# Patient Record
Sex: MALE | Race: WHITE | Employment: UNEMPLOYED | URBAN - NONMETROPOLITAN AREA
[De-identification: names, ages, dates, MRNs, and addresses within clinical notes are randomized per-mention and may not be internally consistent; named-entity substitution may affect disease eponyms.]

---

## 2017-11-26 ENCOUNTER — HOSPITAL ENCOUNTER (INPATIENT)
Age: 32
LOS: 12 days | Discharge: NURSING, REHAB OR LONG-TERM CARE | DRG: 082 | End: 2017-12-08
Attending: FAMILY MEDICINE | Admitting: SURGERY
Payer: COMMERCIAL

## 2017-11-26 ENCOUNTER — APPOINTMENT (OUTPATIENT)
Dept: GENERAL RADIOLOGY | Age: 32
DRG: 082 | End: 2017-11-26
Payer: COMMERCIAL

## 2017-11-26 ENCOUNTER — APPOINTMENT (OUTPATIENT)
Dept: CT IMAGING | Age: 32
DRG: 082 | End: 2017-11-26
Payer: COMMERCIAL

## 2017-11-26 DIAGNOSIS — S01.311A EAR LOBE LACERATION, RIGHT, INITIAL ENCOUNTER: ICD-10-CM
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
## 2017-11-26 LAB
ANION GAP SERPL CALCULATED.3IONS-SCNC: 16 MEQ/L (ref 8–16)
BUN BLDV-MCNC: 18 MG/DL (ref 7–22)
CALCIUM SERPL-MCNC: 8.9 MG/DL (ref 8.5–10.5)
CHLORIDE BLD-SCNC: 99 MEQ/L (ref 98–111)
CO2: 25 MEQ/L (ref 23–33)
CREAT SERPL-MCNC: 0.7 MG/DL (ref 0.4–1.2)
ETHYL ALCOHOL, SERUM: < 0.01 %
GFR SERPL CREATININE-BSD FRML MDRD: > 90 ML/MIN/1.73M2
GLUCOSE BLD-MCNC: 156 MG/DL (ref 70–108)
OSMOLALITY CALCULATION: 284.5 MOSMOL/KG (ref 275–300)
POTASSIUM SERPL-SCNC: 3.3 MEQ/L (ref 3.5–5.2)
SODIUM BLD-SCNC: 140 MEQ/L (ref 135–145)
TROPONIN T: < 0.01 NG/ML

## 2017-11-26 PROCEDURE — 70450 CT HEAD/BRAIN W/O DYE: CPT

## 2017-11-26 PROCEDURE — 80048 BASIC METABOLIC PNL TOTAL CA: CPT

## 2017-11-26 PROCEDURE — 6360000002 HC RX W HCPCS: Performed by: FAMILY MEDICINE

## 2017-11-26 PROCEDURE — 99285 EMERGENCY DEPT VISIT HI MDM: CPT

## 2017-11-26 PROCEDURE — 84484 ASSAY OF TROPONIN QUANT: CPT

## 2017-11-26 PROCEDURE — 96365 THER/PROPH/DIAG IV INF INIT: CPT

## 2017-11-26 PROCEDURE — G0480 DRUG TEST DEF 1-7 CLASSES: HCPCS

## 2017-11-26 PROCEDURE — 2700000000 HC OXYGEN THERAPY PER DAY

## 2017-11-26 PROCEDURE — A6212 FOAM DRG <=16 SQ IN W/BORDER: HCPCS

## 2017-11-26 PROCEDURE — 3209999900 XR COMPARISON OF OUTSIDE FILMS

## 2017-11-26 PROCEDURE — 6360000004 HC RX CONTRAST MEDICATION: Performed by: FAMILY MEDICINE

## 2017-11-26 PROCEDURE — 96368 THER/DIAG CONCURRENT INF: CPT

## 2017-11-26 PROCEDURE — 90715 TDAP VACCINE 7 YRS/> IM: CPT | Performed by: FAMILY MEDICINE

## 2017-11-26 PROCEDURE — 2000000000 HC ICU R&B

## 2017-11-26 PROCEDURE — 71260 CT THORAX DX C+: CPT

## 2017-11-26 PROCEDURE — 96366 THER/PROPH/DIAG IV INF ADDON: CPT

## 2017-11-26 PROCEDURE — 90471 IMMUNIZATION ADMIN: CPT | Performed by: FAMILY MEDICINE

## 2017-11-26 PROCEDURE — 2580000003 HC RX 258: Performed by: FAMILY MEDICINE

## 2017-11-26 PROCEDURE — 6360000002 HC RX W HCPCS

## 2017-11-26 PROCEDURE — 76376 3D RENDER W/INTRP POSTPROCES: CPT

## 2017-11-26 PROCEDURE — 5A1955Z RESPIRATORY VENTILATION, GREATER THAN 96 CONSECUTIVE HOURS: ICD-10-PCS | Performed by: SURGERY

## 2017-11-26 PROCEDURE — 6820000003 HC L2 TRAUMA ALERT ACTIVATION

## 2017-11-26 PROCEDURE — 76705 ECHO EXAM OF ABDOMEN: CPT

## 2017-11-26 PROCEDURE — 70486 CT MAXILLOFACIAL W/O DYE: CPT

## 2017-11-26 PROCEDURE — 74177 CT ABD & PELVIS W/CONTRAST: CPT

## 2017-11-26 PROCEDURE — 36415 COLL VENOUS BLD VENIPUNCTURE: CPT

## 2017-11-26 PROCEDURE — 94002 VENT MGMT INPAT INIT DAY: CPT

## 2017-11-26 PROCEDURE — 85025 COMPLETE CBC W/AUTO DIFF WBC: CPT

## 2017-11-26 PROCEDURE — 85576 BLOOD PLATELET AGGREGATION: CPT

## 2017-11-26 RX ORDER — 0.9 % SODIUM CHLORIDE 0.9 %
1000 INTRAVENOUS SOLUTION INTRAVENOUS ONCE
Status: COMPLETED | OUTPATIENT
Start: 2017-11-26 | End: 2017-11-27

## 2017-11-26 RX ORDER — PROPOFOL 10 MG/ML
INJECTION, EMULSION INTRAVENOUS
Status: COMPLETED
Start: 2017-11-26 | End: 2017-11-26

## 2017-11-26 RX ORDER — PROPOFOL 10 MG/ML
10 INJECTION, EMULSION INTRAVENOUS
Status: DISCONTINUED | OUTPATIENT
Start: 2017-11-26 | End: 2017-12-01

## 2017-11-26 RX ADMIN — TETANUS TOXOID, REDUCED DIPHTHERIA TOXOID AND ACELLULAR PERTUSSIS VACCINE, ADSORBED 0.5 ML: 5; 2.5; 8; 8; 2.5 SUSPENSION INTRAMUSCULAR at 23:56

## 2017-11-26 RX ADMIN — FENTANYL CITRATE 12.5 MCG/HR: 50 INJECTION, SOLUTION INTRAMUSCULAR; INTRAVENOUS at 23:56

## 2017-11-26 RX ADMIN — SODIUM CHLORIDE 1000 ML: 9 INJECTION, SOLUTION INTRAVENOUS at 23:30

## 2017-11-26 RX ADMIN — PROPOFOL 10 MCG/KG/MIN: 10 INJECTION, EMULSION INTRAVENOUS at 22:45

## 2017-11-26 RX ADMIN — IOPAMIDOL 80 ML: 755 INJECTION, SOLUTION INTRAVENOUS at 23:27

## 2017-11-26 ASSESSMENT — PULMONARY FUNCTION TESTS: PIF_VALUE: 24

## 2017-11-27 ENCOUNTER — APPOINTMENT (OUTPATIENT)
Dept: CT IMAGING | Age: 32
DRG: 082 | End: 2017-11-27
Payer: COMMERCIAL

## 2017-11-27 ENCOUNTER — APPOINTMENT (OUTPATIENT)
Dept: GENERAL RADIOLOGY | Age: 32
DRG: 082 | End: 2017-11-27
Payer: COMMERCIAL

## 2017-11-27 PROBLEM — S00.03XA CONTUSION OF PARIETAL REGION OF SCALP: Status: ACTIVE | Noted: 2017-11-27

## 2017-11-27 PROBLEM — S60.519A HAND ABRASION: Status: ACTIVE | Noted: 2017-11-27

## 2017-11-27 PROBLEM — S06.9XAA TBI (TRAUMATIC BRAIN INJURY): Status: ACTIVE | Noted: 2017-11-27

## 2017-11-27 LAB
ALLEN TEST: POSITIVE
ALLEN TEST: POSITIVE
ANGLE TEG: 67 DEG (ref 53–72)
ANION GAP SERPL CALCULATED.3IONS-SCNC: 12 MEQ/L (ref 8–16)
BANDED NEUTROPHILS ABSOLUTE COUNT: 0.4 THOU/MM3
BANDS PRESENT: 2 %
BASE EXCESS (CALCULATED): 0.4 MMOL/L (ref -2.5–2.5)
BASE EXCESS (CALCULATED): 1.2 MMOL/L (ref -2.5–2.5)
BASOPHILS # BLD: 0 %
BASOPHILS # BLD: 0 %
BASOPHILS ABSOLUTE: 0 THOU/MM3 (ref 0–0.1)
BASOPHILS ABSOLUTE: 0 THOU/MM3 (ref 0–0.1)
BUN BLDV-MCNC: 15 MG/DL (ref 7–22)
CALCIUM SERPL-MCNC: 8.4 MG/DL (ref 8.5–10.5)
CHLORIDE BLD-SCNC: 101 MEQ/L (ref 98–111)
CO2: 26 MEQ/L (ref 23–33)
COLLECTED BY:: ABNORMAL
COLLECTED BY:: NORMAL
CREAT SERPL-MCNC: 0.6 MG/DL (ref 0.4–1.2)
DEVICE: ABNORMAL
DEVICE: NORMAL
DIFFERENTIAL, MANUAL: NORMAL
EOSINOPHIL # BLD: 0 %
EOSINOPHIL # BLD: 0.2 %
EOSINOPHILS ABSOLUTE: 0 THOU/MM3 (ref 0–0.4)
EOSINOPHILS ABSOLUTE: 0 THOU/MM3 (ref 0–0.4)
EPL-TEG: 0 %
GFR SERPL CREATININE-BSD FRML MDRD: > 90 ML/MIN/1.73M2
GLUCOSE BLD-MCNC: 130 MG/DL (ref 70–108)
HCO3: 25 MMOL/L (ref 23–28)
HCO3: 28 MMOL/L (ref 23–28)
HCT VFR BLD CALC: 45 % (ref 42–52)
HCT VFR BLD CALC: 51 % (ref 42–52)
HEMOGLOBIN: 15.3 GM/DL (ref 14–18)
HEMOGLOBIN: 17.1 GM/DL (ref 14–18)
HEPARIN THERAPY: NO
IFIO2: 25
IFIO2: 80
INHIBITION AA TEG: 11.4 %
INHIBITION ADP TEG: 28.8 %
KINETICS TEG: 2.1 MINUTES (ref 1–3)
LY30 (LYSIS) TEG: 0 % (ref 0–7.5)
LYMPHOCYTES # BLD: 15 %
LYMPHOCYTES # BLD: 8 %
LYMPHOCYTES ABSOLUTE: 1 THOU/MM3 (ref 1–4.8)
LYMPHOCYTES ABSOLUTE: 3.1 THOU/MM3 (ref 1–4.8)
MA (MAX CLOT) TEG: 58.6 MM (ref 50–70)
MA(AA) TEG: 52.6 MM
MA(ACTIVATED) TEG: 5.9 MM
MA(ADP) TEG: 43.4 MM
MCH RBC QN AUTO: 29.1 PG (ref 27–31)
MCH RBC QN AUTO: 29.4 PG (ref 27–31)
MCHC RBC AUTO-ENTMCNC: 33.5 GM/DL (ref 33–37)
MCHC RBC AUTO-ENTMCNC: 34 GM/DL (ref 33–37)
MCV RBC AUTO: 86.6 FL (ref 80–94)
MCV RBC AUTO: 86.8 FL (ref 80–94)
MODE: AC
MODE: NORMAL
MONOCYTES # BLD: 4 %
MONOCYTES # BLD: 6.5 %
MONOCYTES ABSOLUTE: 0.8 THOU/MM3 (ref 0.4–1.3)
MONOCYTES ABSOLUTE: 0.8 THOU/MM3 (ref 0.4–1.3)
MRSA SCREEN RT-PCR: NEGATIVE
NUCLEATED RED BLOOD CELLS: 0 /100 WBC
NUCLEATED RED BLOOD CELLS: 0 /100 WBC
O2 SATURATION: 100 %
O2 SATURATION: 96 %
PCO2: 39 MMHG (ref 35–45)
PCO2: 49 MMHG (ref 35–45)
PDW BLD-RTO: 12.8 % (ref 11.5–14.5)
PDW BLD-RTO: 13.1 % (ref 11.5–14.5)
PH BLOOD GAS: 7.36 (ref 7.35–7.45)
PH BLOOD GAS: 7.42 (ref 7.35–7.45)
PLATELET # BLD: 235 THOU/MM3 (ref 130–400)
PLATELET # BLD: 255 THOU/MM3 (ref 130–400)
PLATELET ESTIMATE: ADEQUATE
PMV BLD AUTO: 8.8 MCM (ref 7.4–10.4)
PMV BLD AUTO: 8.9 MCM (ref 7.4–10.4)
PO2: 201 MMHG (ref 71–104)
PO2: 79 MMHG (ref 71–104)
POTASSIUM SERPL-SCNC: 3.8 MEQ/L (ref 3.5–5.2)
RBC # BLD: 5.19 MILL/MM3 (ref 4.7–6.1)
RBC # BLD: 5.88 MILL/MM3 (ref 4.7–6.1)
REACTION TIME TEG: 4.1 MINUTES (ref 5–10)
SEG NEUTROPHILS: 79 %
SEG NEUTROPHILS: 85.3 %
SEGMENTED NEUTROPHILS ABSOLUTE COUNT: 10.9 THOU/MM3 (ref 1.8–7.7)
SEGMENTED NEUTROPHILS ABSOLUTE COUNT: 16.3 THOU/MM3 (ref 1.8–7.7)
SET PEEP: 5 MMHG
SET RESPIRATORY RATE: 14 BPM
SET TIDAL VOLUME: 700 ML
SODIUM BLD-SCNC: 139 MEQ/L (ref 135–145)
SOURCE, BLOOD GAS: ABNORMAL
SOURCE, BLOOD GAS: NORMAL
WBC # BLD: 12.8 THOU/MM3 (ref 4.8–10.8)
WBC # BLD: 20.6 THOU/MM3 (ref 4.8–10.8)

## 2017-11-27 PROCEDURE — 70450 CT HEAD/BRAIN W/O DYE: CPT

## 2017-11-27 PROCEDURE — 6360000002 HC RX W HCPCS: Performed by: SURGERY

## 2017-11-27 PROCEDURE — 87081 CULTURE SCREEN ONLY: CPT

## 2017-11-27 PROCEDURE — 99233 SBSQ HOSP IP/OBS HIGH 50: CPT | Performed by: SURGERY

## 2017-11-27 PROCEDURE — 2580000003 HC RX 258: Performed by: FAMILY MEDICINE

## 2017-11-27 PROCEDURE — 94003 VENT MGMT INPAT SUBQ DAY: CPT

## 2017-11-27 PROCEDURE — 94640 AIRWAY INHALATION TREATMENT: CPT

## 2017-11-27 PROCEDURE — 99999 PR OFFICE/OUTPT VISIT,PROCEDURE ONLY: CPT | Performed by: NURSE PRACTITIONER

## 2017-11-27 PROCEDURE — 2700000000 HC OXYGEN THERAPY PER DAY

## 2017-11-27 PROCEDURE — 6370000000 HC RX 637 (ALT 250 FOR IP): Performed by: SURGERY

## 2017-11-27 PROCEDURE — 71010 XR CHEST PORTABLE: CPT

## 2017-11-27 PROCEDURE — 3209999900 CT COMPARISON OF OUTSIDE FILMS

## 2017-11-27 PROCEDURE — 87641 MR-STAPH DNA AMP PROBE: CPT

## 2017-11-27 PROCEDURE — C9113 INJ PANTOPRAZOLE SODIUM, VIA: HCPCS | Performed by: SURGERY

## 2017-11-27 PROCEDURE — 36600 WITHDRAWAL OF ARTERIAL BLOOD: CPT

## 2017-11-27 PROCEDURE — 85025 COMPLETE CBC W/AUTO DIFF WBC: CPT

## 2017-11-27 PROCEDURE — 2580000003 HC RX 258: Performed by: SURGERY

## 2017-11-27 PROCEDURE — L0120 CERV FLEX N/ADJ FOAM PRE OTS: HCPCS

## 2017-11-27 PROCEDURE — 6360000002 HC RX W HCPCS: Performed by: FAMILY MEDICINE

## 2017-11-27 PROCEDURE — 99291 CRITICAL CARE FIRST HOUR: CPT | Performed by: INTERNAL MEDICINE

## 2017-11-27 PROCEDURE — 82803 BLOOD GASES ANY COMBINATION: CPT

## 2017-11-27 PROCEDURE — 72125 CT NECK SPINE W/O DYE: CPT

## 2017-11-27 PROCEDURE — 6370000000 HC RX 637 (ALT 250 FOR IP): Performed by: INTERNAL MEDICINE

## 2017-11-27 PROCEDURE — 2580000003 HC RX 258: Performed by: INTERNAL MEDICINE

## 2017-11-27 PROCEDURE — 2000000000 HC ICU R&B

## 2017-11-27 PROCEDURE — 96366 THER/PROPH/DIAG IV INF ADDON: CPT

## 2017-11-27 PROCEDURE — 36415 COLL VENOUS BLD VENIPUNCTURE: CPT

## 2017-11-27 PROCEDURE — 80048 BASIC METABOLIC PNL TOTAL CA: CPT

## 2017-11-27 PROCEDURE — 87205 SMEAR GRAM STAIN: CPT

## 2017-11-27 PROCEDURE — 87070 CULTURE OTHR SPECIMN AEROBIC: CPT

## 2017-11-27 PROCEDURE — 6360000002 HC RX W HCPCS: Performed by: INTERNAL MEDICINE

## 2017-11-27 PROCEDURE — 99253 IP/OBS CNSLTJ NEW/EST LOW 45: CPT | Performed by: PHYSICIAN ASSISTANT

## 2017-11-27 PROCEDURE — 6370000000 HC RX 637 (ALT 250 FOR IP): Performed by: PHYSICIAN ASSISTANT

## 2017-11-27 RX ORDER — PANTOPRAZOLE SODIUM 40 MG/10ML
40 INJECTION, POWDER, LYOPHILIZED, FOR SOLUTION INTRAVENOUS DAILY
Status: DISCONTINUED | OUTPATIENT
Start: 2017-11-27 | End: 2017-11-29

## 2017-11-27 RX ORDER — 0.9 % SODIUM CHLORIDE 0.9 %
10 VIAL (ML) INJECTION DAILY
Status: DISCONTINUED | OUTPATIENT
Start: 2017-11-27 | End: 2017-11-29

## 2017-11-27 RX ORDER — ONDANSETRON 2 MG/ML
4 INJECTION INTRAMUSCULAR; INTRAVENOUS EVERY 6 HOURS PRN
Status: DISCONTINUED | OUTPATIENT
Start: 2017-11-27 | End: 2017-12-08 | Stop reason: HOSPADM

## 2017-11-27 RX ORDER — SODIUM CHLORIDE 0.9 % (FLUSH) 0.9 %
10 SYRINGE (ML) INJECTION EVERY 12 HOURS SCHEDULED
Status: DISCONTINUED | OUTPATIENT
Start: 2017-11-27 | End: 2017-12-08 | Stop reason: HOSPADM

## 2017-11-27 RX ORDER — GINSENG 100 MG
1 CAPSULE ORAL 2 TIMES DAILY
Status: DISCONTINUED | OUTPATIENT
Start: 2017-11-27 | End: 2017-12-08 | Stop reason: HOSPADM

## 2017-11-27 RX ORDER — LEVOFLOXACIN 5 MG/ML
500 INJECTION, SOLUTION INTRAVENOUS ONCE
Status: DISCONTINUED | OUTPATIENT
Start: 2017-11-27 | End: 2017-11-27 | Stop reason: HOSPADM

## 2017-11-27 RX ORDER — POTASSIUM CHLORIDE 20MEQ/15ML
40 LIQUID (ML) ORAL ONCE
Status: COMPLETED | OUTPATIENT
Start: 2017-11-27 | End: 2017-11-27

## 2017-11-27 RX ORDER — SODIUM CHLORIDE 9 MG/ML
INJECTION, SOLUTION INTRAVENOUS CONTINUOUS
Status: DISCONTINUED | OUTPATIENT
Start: 2017-11-27 | End: 2017-12-01

## 2017-11-27 RX ORDER — CHLORHEXIDINE GLUCONATE 0.12 MG/ML
15 RINSE ORAL 2 TIMES DAILY
Status: DISCONTINUED | OUTPATIENT
Start: 2017-11-27 | End: 2017-12-01

## 2017-11-27 RX ORDER — IPRATROPIUM BROMIDE AND ALBUTEROL SULFATE 2.5; .5 MG/3ML; MG/3ML
1 SOLUTION RESPIRATORY (INHALATION) EVERY 4 HOURS
Status: DISCONTINUED | OUTPATIENT
Start: 2017-11-27 | End: 2017-12-03

## 2017-11-27 RX ORDER — ACETAMINOPHEN 325 MG/1
650 TABLET ORAL EVERY 4 HOURS PRN
Status: DISCONTINUED | OUTPATIENT
Start: 2017-11-27 | End: 2017-12-08 | Stop reason: HOSPADM

## 2017-11-27 RX ORDER — SODIUM CHLORIDE 0.9 % (FLUSH) 0.9 %
10 SYRINGE (ML) INJECTION PRN
Status: DISCONTINUED | OUTPATIENT
Start: 2017-11-27 | End: 2017-12-08 | Stop reason: HOSPADM

## 2017-11-27 RX ADMIN — FENTANYL CITRATE 75 MCG/HR: 50 INJECTION, SOLUTION INTRAMUSCULAR; INTRAVENOUS at 15:43

## 2017-11-27 RX ADMIN — CEFAZOLIN SODIUM 1 G: 1 INJECTION, SOLUTION INTRAVENOUS at 10:59

## 2017-11-27 RX ADMIN — IPRATROPIUM BROMIDE AND ALBUTEROL SULFATE 1 AMPULE: .5; 3 SOLUTION RESPIRATORY (INHALATION) at 11:55

## 2017-11-27 RX ADMIN — PROPOFOL 50 MCG/KG/MIN: 10 INJECTION, EMULSION INTRAVENOUS at 09:05

## 2017-11-27 RX ADMIN — SODIUM CHLORIDE: 9 INJECTION, SOLUTION INTRAVENOUS at 00:52

## 2017-11-27 RX ADMIN — BACITRACIN ZINC 1 G: 500 OINTMENT TOPICAL at 20:15

## 2017-11-27 RX ADMIN — PROPOFOL 25 MCG/KG/MIN: 10 INJECTION, EMULSION INTRAVENOUS at 17:39

## 2017-11-27 RX ADMIN — Medication 10 ML: at 09:10

## 2017-11-27 RX ADMIN — CEFAZOLIN SODIUM 1 G: 1 INJECTION, SOLUTION INTRAVENOUS at 03:34

## 2017-11-27 RX ADMIN — IPRATROPIUM BROMIDE AND ALBUTEROL SULFATE 1 AMPULE: .5; 3 SOLUTION RESPIRATORY (INHALATION) at 18:24

## 2017-11-27 RX ADMIN — IPRATROPIUM BROMIDE AND ALBUTEROL SULFATE 1 AMPULE: .5; 3 SOLUTION RESPIRATORY (INHALATION) at 21:03

## 2017-11-27 RX ADMIN — PROPOFOL 50 MCG/KG/MIN: 10 INJECTION, EMULSION INTRAVENOUS at 04:29

## 2017-11-27 RX ADMIN — PROPOFOL 35 MCG/KG/MIN: 10 INJECTION, EMULSION INTRAVENOUS at 20:13

## 2017-11-27 RX ADMIN — PROPOFOL 50 MCG/KG/MIN: 10 INJECTION, EMULSION INTRAVENOUS at 13:25

## 2017-11-27 RX ADMIN — PANTOPRAZOLE SODIUM 40 MG: 40 INJECTION, POWDER, FOR SOLUTION INTRAVENOUS at 09:10

## 2017-11-27 RX ADMIN — POTASSIUM CHLORIDE 40 MEQ: 40 SOLUTION ORAL at 03:20

## 2017-11-27 RX ADMIN — PROPOFOL 50 MCG/KG/MIN: 10 INJECTION, EMULSION INTRAVENOUS at 10:54

## 2017-11-27 RX ADMIN — Medication 10 ML: at 09:11

## 2017-11-27 RX ADMIN — PROPOFOL 50 MCG/KG/MIN: 10 INJECTION, EMULSION INTRAVENOUS at 06:42

## 2017-11-27 RX ADMIN — PROPOFOL 70 MCG/KG/MIN: 10 INJECTION, EMULSION INTRAVENOUS at 00:31

## 2017-11-27 RX ADMIN — SODIUM CHLORIDE: 9 INJECTION, SOLUTION INTRAVENOUS at 09:04

## 2017-11-27 RX ADMIN — ENOXAPARIN SODIUM 40 MG: 40 INJECTION SUBCUTANEOUS at 10:53

## 2017-11-27 RX ADMIN — CEFAZOLIN SODIUM 1 G: 1 INJECTION, SOLUTION INTRAVENOUS at 20:13

## 2017-11-27 RX ADMIN — SODIUM CHLORIDE: 9 INJECTION, SOLUTION INTRAVENOUS at 17:39

## 2017-11-27 RX ADMIN — Medication 15 ML: at 20:13

## 2017-11-27 RX ADMIN — BACITRACIN ZINC 1 G: 500 OINTMENT TOPICAL at 12:43

## 2017-11-27 RX ADMIN — FENTANYL CITRATE 125 MCG/HR: 50 INJECTION, SOLUTION INTRAMUSCULAR; INTRAVENOUS at 08:26

## 2017-11-27 RX ADMIN — Medication 15 ML: at 12:43

## 2017-11-27 RX ADMIN — FENTANYL CITRATE 150 MCG/HR: 50 INJECTION, SOLUTION INTRAMUSCULAR; INTRAVENOUS at 11:08

## 2017-11-27 RX ADMIN — FENTANYL CITRATE 100 MCG/HR: 50 INJECTION, SOLUTION INTRAMUSCULAR; INTRAVENOUS at 20:13

## 2017-11-27 RX ADMIN — PROPOFOL 50 MCG/KG/MIN: 10 INJECTION, EMULSION INTRAVENOUS at 23:27

## 2017-11-27 RX ADMIN — FENTANYL CITRATE 125 MCG/HR: 50 INJECTION, SOLUTION INTRAMUSCULAR; INTRAVENOUS at 04:52

## 2017-11-27 ASSESSMENT — PULMONARY FUNCTION TESTS
PIF_VALUE: 18
PIF_VALUE: 15
PIF_VALUE: 26
PIF_VALUE: 15
PIF_VALUE: 28
PIF_VALUE: 23
PIF_VALUE: 19
PIF_VALUE: 19
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
## 2017-11-27 NOTE — PLAN OF CARE
Problem: Nutrition  Goal: Optimal nutrition therapy  Outcome: Ongoing  Nutrition Problem: Inadequate oral intake  Intervention: Food and/or Nutrient Delivery: Start Tube Feeding  Nutritional Goals: TF to provide % of nutrient needs while pt is intubated.
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
## 2017-11-27 NOTE — PROGRESS NOTES
1920 patient taken to ct scanning per order  1401 fentanyl and propofol stopped for cpap trial  1415 patient purposeful times 4 extremities but does not follow commands , patient continues to reach for ett.   1443 sedation restarted at half the previous rate

## 2017-11-27 NOTE — FLOWSHEET NOTE
6813- Patient admitted to ICU room 15 with ED RN and RT. Patient on ventilator. Diprivan gtt running at 70 mcg/kg/min or 60.1 mL/hr. Fentanyl gtt running at 100 mcg/hr or 20 ml/hr. Lactated ringers hanging and dripping by gravity. Patient transferred to ICU bed, patient noted to have been left on backboard. C-collar remains in place, however per ED RN we no longer need to maintain cspine as all scans were negative. Bedside monitoring initiated and assessment completed. MRSA, VRE, and Respiratory cultures obtained and sent per ICU protocol. Patient agitated and very strong, reaching for ET tube, very tense throughout body but will not follow commands. Patient does not have gag reflex when suctioned orally, patient has very delayed cough when suctioned with in line suction. Pupils are equal and 2 but are extremely sluggish at this time. Right ear with laceration noted, trauma surgeon aware. OG tube placement verified with air bolus and stomach content suctioned. All peripheral pulses palpable. Patient responds to painful stimuli x 4 extremities. Patient turned and backboard removed. 0100- Family brought back to room and updated. Mother Norma Kingdom at bedside as well as girlfriend. All questions answered. ICU brochure and HIPAA code given to mother. 18- Dr. Julianna Lee called, requested electrolyte replacements at this time and updated that hospitalist would see tonight for any pressing matters and intensivist would see tomorrow not pulmonology. Orders received. Trell Gillis updated with patient and status. At this time, nothing pressing in the way of orders required for the patient, he will defer to the daytime intensivist unless the patient requires immediate attention. 4735- Patient placed in [place] collar. Right ear dressing changed, noted a moderate amount of bleeding from right ear laceration. Will monitor closely.       2823- Dr. Hiro Hull paged regarding consult, page immediately

## 2017-11-27 NOTE — ED NOTES
Bed: 004A  Expected date: 11/26/17  Expected time: 10:12 PM  Means of arrival: [facility]  Comments:     Jerzy Shelby RN  11/26/17 3969
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
## 2017-11-27 NOTE — CONSULTS
Constitutional: Patient is on the ventilator and in a C-collar  HENT:   Head: Normocephalic. Ecchymosis throughout  Right Ear: Severe transverse laceration from just under the tragus to the antihelix. Cartilage displaced throughout. May be a perpendicular component as well. No active bleeding. EAC not examined will due to location of the laceration and the position of the patient. Neck: In C-collar  Pulmonary/Chest:  No stridor.          DATA:    CBC:   Lab Results   Component Value Date    WBC 12.8 11/27/2017    RBC 5.19 11/27/2017    HGB 15.3 11/27/2017    HCT 45.0 11/27/2017    MCV 86.6 11/27/2017    MCH 29.4 11/27/2017    MCHC 34.0 11/27/2017    RDW 13.1 11/27/2017     11/27/2017    MPV 8.9 11/27/2017     CBC with Differential:    Lab Results   Component Value Date    WBC 12.8 11/27/2017    RBC 5.19 11/27/2017    HGB 15.3 11/27/2017    HCT 45.0 11/27/2017     11/27/2017    MCV 86.6 11/27/2017    MCH 29.4 11/27/2017    MCHC 34.0 11/27/2017    RDW 13.1 11/27/2017    NRBC 0 11/27/2017    SEGSPCT 85.3 11/27/2017    MONOPCT 6.5 11/27/2017    MONOSABS 0.8 11/27/2017    LYMPHSABS 1.0 11/27/2017    EOSABS 0.0 11/27/2017    BASOSABS 0.0 11/27/2017     WBC:    Lab Results   Component Value Date    WBC 12.8 11/27/2017     Platelets:    Lab Results   Component Value Date     11/27/2017     Hemoglobin/Hematocrit:    Lab Results   Component Value Date    HGB 15.3 11/27/2017    HCT 45.0 11/27/2017     CMP:    Lab Results   Component Value Date     11/27/2017    K 3.8 11/27/2017     11/27/2017    CO2 26 11/27/2017    BUN 15 11/27/2017    CREATININE 0.6 11/27/2017    LABGLOM >90 11/27/2017    GLUCOSE 130 11/27/2017    CALCIUM 8.4 11/27/2017     BMP:    Lab Results   Component Value Date     11/27/2017    K 3.8 11/27/2017     11/27/2017    CO2 26 11/27/2017    BUN 15 11/27/2017    CREATININE 0.6 11/27/2017    CALCIUM 8.4 11/27/2017    LABGLOM >90 11/27/2017    GLUCOSE 130 11/27/2017 images were obtained through the orbits. 3 mm coronal reconstructions were obtained.       All CT scans at this facility use dose modulation, iterative reconstruction, and/or weight-based dosing when appropriate to reduce radiation dose to as low as reasonably achievable.       FINDINGS:       The patient is intubated. There is an orogastric tube.       There are no fractures.       The mandible is intact.       There is mild diffuse thickening in the ethmoid sinuses. The other paranasal sinuses are clear. The mastoids are clear.       The temporal bones are intact. The external auditory canals appear normal.        The globes and intraorbital contents appear normal. There is no obvious acute appearing soft tissue abnormality on these images.           Impression   1.  No fractures or other suspicious osseous findings. 2.  No obvious soft tissue abnormalities. 3.  Mild mucosal thickening in the ethmoid sinuses.                   **This report has been created using voice recognition software. It may contain minor errors which are inherent in voice recognition technology. **       Final report electronically signed by Dr. Meg Devine on 11/26/2017 11:48 PM             IMPRESSION/RECOMMENDATIONS:      Ear Laceration, Right  - Laceration if very extensive. - Cartilage displaced throughout  - Difficult to tell, but may be a perpendicular component to this. - Case was discussed with Dr. Alan Spurling and he thought if would be wise for Plastic/Reconstructive surgery to to see the patient. Nurse notified and consult placed  - Awaiting to hear from them if they are willing to see the patient.   - No attempt was made to suture this last evening  - Patient is not having active bleeding    Case was discussed with Dr. Alan Spurling and his recommendations are noted above.            Electronically signed by PENELOPE Borges on 11/27/2017 at 10:12 AM

## 2017-11-27 NOTE — PROGRESS NOTES
Gross per 24 hour   Intake             1469 ml   Output             2125 ml   Net             -656 ml   BM = 0    Diet NPO Effective Now    OBJECTIVE  CURRENT VITALS /62   Pulse 85   Temp 99.7 °F (37.6 °C) (Esophageal)   Resp 17   Ht 6' 3\" (1.905 m)   Wt 299 lb 2.6 oz (135.7 kg)   SpO2 94%   BMI 37.39 kg/m²      GENERAL: Lying in bed in ICU with cervical collar in place. Intubated and sedated. NEURO: PERRL. Moving all extremities but not to command. Sedated with Propofol and Fentanyl. LUNGS: vent set rate 14, , PEEP 0, FiO2 25%. Breathing above vent at rate of 19. Clear to auscultation bilaterally- no wheezes, rales or rhonchi, normal air movement, no respiratory distress  HEART: NSR on bedside monitor with no ectopy, normal S1 and S2, no gallops, intact distal pulses and no carotid bruits  ABDOMEN: soft, non-tender, non-distended, hypoactive normal toned bowel sounds, no masses or organomegaly  WOUNDS: right ear with extensive laceration, cartilage is visible, bloody drainage just cleansed off with saline - no active bleeding. Scattered abrasions to bilateral upper extremities, no active drainage. EXTREMITY: No obvious deformities, no cyanosis, no clubbing and no edema      LABS  CBC : Recent Labs      11/26/17 2249 11/27/17   0420   WBC  20.6*  12.8*   HGB  17.1  15.3   HCT  51.0  45.0   MCV  86.8  86.6   PLT  255  235     BMP: Recent Labs      11/26/17 2249 11/27/17   0420   NA  140  139   K  3.3*  3.8   CL  99  101   CO2  25  26   BUN  18  15   CREATININE  0.7  0.6     COAGS: No results for input(s): APTT, PROT, INR in the last 72 hours. Pancreas/HFP:  No results for input(s): LIPASE, AMYLASE in the last 72 hours. No results for input(s): AST, ALT, BILIDIR, BILITOT, ALKPHOS in the last 72 hours.     RADIOLOGY:  11/27/17  PROCEDURE: CT HEAD WO CONTRAST       CLINICAL INFORMATION: head trauma.       COMPARISON: CT of the head dated November 26, 2017.       TECHNIQUE: Noncontrast 5 mm axial images were obtained through the brain.       All CT scans at this facility use dose modulation, iterative reconstruction, and/or weight-based dosing when appropriate to reduce radiation dose to as low as reasonably achievable.       FINDINGS:       The brain parenchymal volume and gray-white interface is maintained. No intracranial hemorrhage is seen. No mass, mass effect or extra-axial fluid collection is identified. The ventricles are midline without evidence of hydrocephalus. The basal cisterns    are patent.       The visualized orbits, temporal bone structures are unremarkable. Mild mucosal thickening is present within the ethmoid air cells and there is fluid within the nasopharynx and nasal cavity, likely secondary to the patient's intubated status. The    calvarium is intact without acute fracture or aggressive, bony destructive process. There is subtle soft tissue swelling and stranding along the left parietal scalp, likely corresponding to a scalp contusion.           Impression        No acute intracranial traumatic abnormality is identified. There is subtle soft tissue swelling along the left parietal scalp, likely corresponding to a scalp contusion.                   **This report has been created using voice recognition software. It may contain minor errors which are inherent in voice recognition technology. **       Final report electronically signed by Dr. Francisca Begum on 11/27/2017 8:32 AM     PROCEDURE: XR CHEST PORTABLE       CLINICAL INFORMATION: trauma/vent, .       COMPARISON: CT of the chest abdomen and pelvis from November 26, 2017.       TECHNIQUE: AP upright view of the chest.       FINDINGS:       The tip of the endotracheal tube is 3.7 cm above the ivan. There is a gastric tube which is well within stomach.       The patient is tilted to the right side is slightly rotated to the right side as well.       There are mildly to moderately reduced lung volumes. There is a relatively dense opacity in the right upper lobe and a dense opacity in the left infrahilar region extending into the left retrocardiac region.       There is no pneumothorax.       The heart size appears normal on x-ray.       The mediastinum is not widened.       The hilar vessels are normal in appearance.           Impression   1.  No pneumothorax. 2.  The endotracheal tube tip is 2.7 cm above the ivan. The NG tube in the stomach. 3.  Right upper lobe and right lower lobe pneumonia.                   **This report has been created using voice recognition software. It may contain minor errors which are inherent in voice recognition technology. **       Final report electronically signed by Dr. Chuck Calix on 11/27/2017 5:56 AM         Electronically signed by Antolin Mcnair CNP on 11/27/2017 at 11:18 AM Patient seen and examined independently by me. Above discussed and I agree with CNP. Labs, cultures, and radiographs where available were reviewed. See orders for the updated patient care plan. Vicky Hanson MD, Repeat CT scan of the head reveals no changes no bleeding noted CT spine also was performed which was normal discussed with ICU nurse patient is breathing on her own but when he was given a sedation holiday became quite agitated discussed patient's ear laceration with Dr. Azeem Richey plastic surgeon.   Given at this time the unknown process ongoing with his brain he feels that we should allow this to heal by secondary intention there is minimal bleeding from the ear lungs are clear abdomen is soft discussed with patient's mother who is at the bedside continue support  11/27/2017   7:16 PM

## 2017-11-28 ENCOUNTER — APPOINTMENT (OUTPATIENT)
Dept: GENERAL RADIOLOGY | Age: 32
DRG: 082 | End: 2017-11-28
Payer: COMMERCIAL

## 2017-11-28 PROBLEM — E66.01 MORBID OBESITY WITH BODY MASS INDEX (BMI) OF 40.0 TO 44.9 IN ADULT (HCC): Status: ACTIVE | Noted: 2017-11-28

## 2017-11-28 LAB
ALLEN TEST: ABNORMAL
ANION GAP SERPL CALCULATED.3IONS-SCNC: 11 MEQ/L (ref 8–16)
BASE EXCESS (CALCULATED): 1.1 MMOL/L (ref -2.5–2.5)
BUN BLDV-MCNC: 8 MG/DL (ref 7–22)
CALCIUM SERPL-MCNC: 8.6 MG/DL (ref 8.5–10.5)
CHLORIDE BLD-SCNC: 106 MEQ/L (ref 98–111)
CO2: 25 MEQ/L (ref 23–33)
COLLECTED BY:: ABNORMAL
COMMENT: ABNORMAL
CREAT SERPL-MCNC: 0.6 MG/DL (ref 0.4–1.2)
DEVICE: ABNORMAL
GFR SERPL CREATININE-BSD FRML MDRD: > 90 ML/MIN/1.73M2
GLUCOSE BLD-MCNC: 113 MG/DL (ref 70–108)
GRAM STAIN RESULT: NORMAL
HCO3: 27 MMOL/L (ref 23–28)
HCT VFR BLD CALC: 43.6 % (ref 42–52)
HEMOGLOBIN: 14.6 GM/DL (ref 14–18)
IFIO2: 25
MCH RBC QN AUTO: 29.1 PG (ref 27–31)
MCHC RBC AUTO-ENTMCNC: 33.5 GM/DL (ref 33–37)
MCV RBC AUTO: 86.9 FL (ref 80–94)
O2 SATURATION: 91 %
PCO2: 48 MMHG (ref 35–45)
PDW BLD-RTO: 12.8 % (ref 11.5–14.5)
PH BLOOD GAS: 7.36 (ref 7.35–7.45)
PLATELET # BLD: 171 THOU/MM3 (ref 130–400)
PMV BLD AUTO: 8.6 MCM (ref 7.4–10.4)
PO2: 65 MMHG (ref 71–104)
POTASSIUM SERPL-SCNC: 3.8 MEQ/L (ref 3.5–5.2)
RBC # BLD: 5.01 MILL/MM3 (ref 4.7–6.1)
RESPIRATORY CULTURE: NORMAL
SET PEEP: 5 MMHG
SODIUM BLD-SCNC: 142 MEQ/L (ref 135–145)
SOURCE, BLOOD GAS: ABNORMAL
VRE CULTURE: NORMAL
WBC # BLD: 6.2 THOU/MM3 (ref 4.8–10.8)

## 2017-11-28 PROCEDURE — 80048 BASIC METABOLIC PNL TOTAL CA: CPT

## 2017-11-28 PROCEDURE — 6370000000 HC RX 637 (ALT 250 FOR IP): Performed by: NURSE PRACTITIONER

## 2017-11-28 PROCEDURE — 2700000000 HC OXYGEN THERAPY PER DAY

## 2017-11-28 PROCEDURE — 2500000003 HC RX 250 WO HCPCS: Performed by: INTERNAL MEDICINE

## 2017-11-28 PROCEDURE — 6360000002 HC RX W HCPCS: Performed by: INTERNAL MEDICINE

## 2017-11-28 PROCEDURE — 6360000002 HC RX W HCPCS: Performed by: SURGERY

## 2017-11-28 PROCEDURE — C9113 INJ PANTOPRAZOLE SODIUM, VIA: HCPCS | Performed by: SURGERY

## 2017-11-28 PROCEDURE — 99233 SBSQ HOSP IP/OBS HIGH 50: CPT | Performed by: SURGERY

## 2017-11-28 PROCEDURE — 36415 COLL VENOUS BLD VENIPUNCTURE: CPT

## 2017-11-28 PROCEDURE — 6360000002 HC RX W HCPCS

## 2017-11-28 PROCEDURE — 2580000003 HC RX 258: Performed by: SURGERY

## 2017-11-28 PROCEDURE — 2580000003 HC RX 258: Performed by: INTERNAL MEDICINE

## 2017-11-28 PROCEDURE — 71010 XR CHEST PORTABLE: CPT

## 2017-11-28 PROCEDURE — 85027 COMPLETE CBC AUTOMATED: CPT

## 2017-11-28 PROCEDURE — 94640 AIRWAY INHALATION TREATMENT: CPT

## 2017-11-28 PROCEDURE — 36600 WITHDRAWAL OF ARTERIAL BLOOD: CPT

## 2017-11-28 PROCEDURE — 6370000000 HC RX 637 (ALT 250 FOR IP): Performed by: INTERNAL MEDICINE

## 2017-11-28 PROCEDURE — 90686 IIV4 VACC NO PRSV 0.5 ML IM: CPT | Performed by: SURGERY

## 2017-11-28 PROCEDURE — 6370000000 HC RX 637 (ALT 250 FOR IP): Performed by: PHYSICIAN ASSISTANT

## 2017-11-28 PROCEDURE — G0008 ADMIN INFLUENZA VIRUS VAC: HCPCS | Performed by: SURGERY

## 2017-11-28 PROCEDURE — 82803 BLOOD GASES ANY COMBINATION: CPT

## 2017-11-28 PROCEDURE — 94003 VENT MGMT INPAT SUBQ DAY: CPT

## 2017-11-28 PROCEDURE — 99999 PR OFFICE/OUTPT VISIT,PROCEDURE ONLY: CPT | Performed by: NURSE PRACTITIONER

## 2017-11-28 PROCEDURE — 2000000000 HC ICU R&B

## 2017-11-28 RX ORDER — PROPOFOL 10 MG/ML
INJECTION, EMULSION INTRAVENOUS
Status: COMPLETED
Start: 2017-11-28 | End: 2017-11-28

## 2017-11-28 RX ORDER — SENNOSIDES 8.8 MG/5ML
10 LIQUID ORAL NIGHTLY
Status: DISCONTINUED | OUTPATIENT
Start: 2017-11-28 | End: 2017-12-04

## 2017-11-28 RX ORDER — MIDAZOLAM HYDROCHLORIDE 1 MG/ML
INJECTION INTRAMUSCULAR; INTRAVENOUS
Status: DISPENSED
Start: 2017-11-28 | End: 2017-11-28

## 2017-11-28 RX ORDER — DOCUSATE SODIUM 50 MG/5ML
100 LIQUID ORAL 2 TIMES DAILY
Status: DISCONTINUED | OUTPATIENT
Start: 2017-11-28 | End: 2017-12-04

## 2017-11-28 RX ORDER — NICOTINE 21 MG/24HR
1 PATCH, TRANSDERMAL 24 HOURS TRANSDERMAL DAILY
Status: DISCONTINUED | OUTPATIENT
Start: 2017-11-28 | End: 2017-12-08 | Stop reason: HOSPADM

## 2017-11-28 RX ADMIN — PROPOFOL 50 MCG/KG/MIN: 10 INJECTION, EMULSION INTRAVENOUS at 08:49

## 2017-11-28 RX ADMIN — SODIUM CHLORIDE: 9 INJECTION, SOLUTION INTRAVENOUS at 08:49

## 2017-11-28 RX ADMIN — ENOXAPARIN SODIUM 40 MG: 40 INJECTION SUBCUTANEOUS at 09:03

## 2017-11-28 RX ADMIN — PROPOFOL 50 MCG/KG/MIN: 10 INJECTION, EMULSION INTRAVENOUS at 06:16

## 2017-11-28 RX ADMIN — IPRATROPIUM BROMIDE AND ALBUTEROL SULFATE 1 AMPULE: .5; 3 SOLUTION RESPIRATORY (INHALATION) at 20:48

## 2017-11-28 RX ADMIN — IPRATROPIUM BROMIDE AND ALBUTEROL SULFATE 1 AMPULE: .5; 3 SOLUTION RESPIRATORY (INHALATION) at 00:28

## 2017-11-28 RX ADMIN — SODIUM CHLORIDE: 9 INJECTION, SOLUTION INTRAVENOUS at 18:40

## 2017-11-28 RX ADMIN — FENTANYL CITRATE 100 MCG/HR: 50 INJECTION, SOLUTION INTRAMUSCULAR; INTRAVENOUS at 13:22

## 2017-11-28 RX ADMIN — AMPICILLIN SODIUM AND SULBACTAM SODIUM 3 G: 2; 1 INJECTION, POWDER, FOR SOLUTION INTRAMUSCULAR; INTRAVENOUS at 22:56

## 2017-11-28 RX ADMIN — DEXMEDETOMIDINE HYDROCHLORIDE 0.7 MCG/KG/HR: 100 INJECTION, SOLUTION INTRAVENOUS at 18:40

## 2017-11-28 RX ADMIN — PROPOFOL 40 MCG/KG/MIN: 10 INJECTION, EMULSION INTRAVENOUS at 15:33

## 2017-11-28 RX ADMIN — PROPOFOL 50 MCG/KG/MIN: 10 INJECTION, EMULSION INTRAVENOUS at 01:53

## 2017-11-28 RX ADMIN — IPRATROPIUM BROMIDE AND ALBUTEROL SULFATE 1 AMPULE: .5; 3 SOLUTION RESPIRATORY (INHALATION) at 17:38

## 2017-11-28 RX ADMIN — Medication 15 ML: at 20:28

## 2017-11-28 RX ADMIN — IPRATROPIUM BROMIDE AND ALBUTEROL SULFATE 1 AMPULE: .5; 3 SOLUTION RESPIRATORY (INHALATION) at 04:24

## 2017-11-28 RX ADMIN — Medication 10 ML: at 08:57

## 2017-11-28 RX ADMIN — FENTANYL CITRATE 125 MCG/HR: 50 INJECTION, SOLUTION INTRAMUSCULAR; INTRAVENOUS at 00:27

## 2017-11-28 RX ADMIN — Medication 10 ML: at 08:55

## 2017-11-28 RX ADMIN — AMPICILLIN SODIUM AND SULBACTAM SODIUM 3 G: 2; 1 INJECTION, POWDER, FOR SOLUTION INTRAMUSCULAR; INTRAVENOUS at 15:50

## 2017-11-28 RX ADMIN — BACITRACIN ZINC 1 G: 500 OINTMENT TOPICAL at 09:04

## 2017-11-28 RX ADMIN — PROPOFOL 40 MCG/KG/MIN: 10 INJECTION, EMULSION INTRAVENOUS at 21:29

## 2017-11-28 RX ADMIN — Medication 17.6 MG: at 20:28

## 2017-11-28 RX ADMIN — FENTANYL CITRATE 75 MCG/HR: 50 INJECTION, SOLUTION INTRAMUSCULAR; INTRAVENOUS at 19:56

## 2017-11-28 RX ADMIN — Medication 10 ML: at 20:28

## 2017-11-28 RX ADMIN — DEXMEDETOMIDINE HYDROCHLORIDE 0.7 MCG/KG/HR: 100 INJECTION, SOLUTION INTRAVENOUS at 15:27

## 2017-11-28 RX ADMIN — DOCUSATE SODIUM 100 MG: 50 LIQUID ORAL at 20:28

## 2017-11-28 RX ADMIN — FENTANYL CITRATE 125 MCG/HR: 50 INJECTION, SOLUTION INTRAMUSCULAR; INTRAVENOUS at 08:50

## 2017-11-28 RX ADMIN — INFLUENZA A VIRUS A/SINGAPORE/GP1908/2015 IVR-180A (H1N1) ANTIGEN (PROPIOLACTONE INACTIVATED), INFLUENZA A VIRUS A/HONG KONG/4801/2014 X-263B (H3N2) ANTIGEN (PROPIOLACTONE INACTIVATED), INFLUENZA B VIRUS B/BRISBANE/46/2015 ANTIGEN (PROPIOLACTONE INACTIVATED), AND INFLUENZA B VIRUS B/PHUKET/3073/2013 BVR-1B ANTIGEN (PROPIOLACTONE INACTIVATED) 0.5 ML: 15; 15; 15; 15 INJECTION, SUSPENSION INTRAMUSCULAR at 09:26

## 2017-11-28 RX ADMIN — FENTANYL CITRATE 125 MCG/HR: 50 INJECTION, SOLUTION INTRAMUSCULAR; INTRAVENOUS at 04:35

## 2017-11-28 RX ADMIN — SODIUM CHLORIDE: 9 INJECTION, SOLUTION INTRAVENOUS at 01:55

## 2017-11-28 RX ADMIN — Medication 15 ML: at 09:05

## 2017-11-28 RX ADMIN — AMPICILLIN SODIUM AND SULBACTAM SODIUM 3 G: 2; 1 INJECTION, POWDER, FOR SOLUTION INTRAMUSCULAR; INTRAVENOUS at 12:11

## 2017-11-28 RX ADMIN — PANTOPRAZOLE SODIUM 40 MG: 40 INJECTION, POWDER, FOR SOLUTION INTRAVENOUS at 08:54

## 2017-11-28 RX ADMIN — PROPOFOL 40 MCG/KG/MIN: 10 INJECTION, EMULSION INTRAVENOUS at 18:41

## 2017-11-28 RX ADMIN — IPRATROPIUM BROMIDE AND ALBUTEROL SULFATE 1 AMPULE: .5; 3 SOLUTION RESPIRATORY (INHALATION) at 08:52

## 2017-11-28 RX ADMIN — DEXMEDETOMIDINE HYDROCHLORIDE 0.7 MCG/KG/HR: 100 INJECTION, SOLUTION INTRAVENOUS at 22:54

## 2017-11-28 RX ADMIN — BACITRACIN ZINC 1 G: 500 OINTMENT TOPICAL at 20:28

## 2017-11-28 RX ADMIN — IPRATROPIUM BROMIDE AND ALBUTEROL SULFATE 1 AMPULE: .5; 3 SOLUTION RESPIRATORY (INHALATION) at 14:07

## 2017-11-28 RX ADMIN — PROPOFOL 50 MCG/KG/MIN: 10 INJECTION, EMULSION INTRAVENOUS at 10:35

## 2017-11-28 RX ADMIN — CEFAZOLIN SODIUM 1 G: 1 INJECTION, SOLUTION INTRAVENOUS at 03:47

## 2017-11-28 RX ADMIN — PROPOFOL 50 MCG/KG/MIN: 10 INJECTION, EMULSION INTRAVENOUS at 04:15

## 2017-11-28 RX ADMIN — PROPOFOL 40 MCG/KG/MIN: 10 INJECTION, EMULSION INTRAVENOUS at 23:59

## 2017-11-28 RX ADMIN — PROPOFOL 50 MCG/KG/MIN: 10 INJECTION, EMULSION INTRAVENOUS at 13:20

## 2017-11-28 ASSESSMENT — PULMONARY FUNCTION TESTS
PIF_VALUE: 18
PIF_VALUE: 15
PIF_VALUE: 15
PIF_VALUE: 14
PIF_VALUE: 21
PIF_VALUE: 15

## 2017-11-28 NOTE — PLAN OF CARE
Problem: MECHANICAL VENTILATION  Goal: Patient will achieve/maintain normal respiratory rate/effort  Outcome: Not Met This Shift  VENTILATOR LIBERATION PROTOCOL    PRE-TRIAL PATIENT ASSESSMENT - COMPLETED AT 1430    Ventilatory Assessment:    PARAMETER CRITERIA FOR WEANING   Reversal  of the acute disease process that prompted intubation: Yes At least partial or complete reversal   FiO2 : 40 % FIO2 less than or equal to 50%     PEEP less than or equal to 5 cm H2O   Hemodynamic stability: No Dopamine or Dobutamine at 5 mcg/kg/minute or less   Adequate correction of electrolytes, Hgb, and HCT: Yes Within lab range   Neurologic stability: No Ability to cough, voluntarily initiate ventilator effort, cooperate with pulmonary toilet measures     ABG:   Lab Results   Component Value Date    PH 7.36 11/28/2017    PO2 65 11/28/2017    PCO2 48 11/28/2017    HCO3 27 11/28/2017    O2SAT 91 11/28/2017     HGB/WBC:  Lab Results   Component Value Date    HGB 14.6 11/28/2017    WBC 6.2 11/28/2017         Vital Signs:    PARAMETER CRITERIA FOR WEANING Meets Criteria   Pulse: 91 Within patient's normal limits / stable Yes   Resp: 18 Less than or equal to 30 Yes   BP: 128/74 Within patient's normal limits / minimal pressors (Hemodynamically Stable) Yes   SpO2: 93 % Greater than or equal to 90% Yes   Temp: 99.5 °F (37.5 °C) Less than 38. 5oC / 101. 3oF Yes    Sedation/paralytic weaned Yes     []    Based on this assessment and the Ventilator Liberation Protocol, this patient IS being placed on a Spontaneous Breathing Trial (SBT) at this time. [x]    Based on this assessment and the Ventilator Liberation Protocol, this patient IS NOT being placed on a Spontaneous Breathing Trial (SBT) at this time. COMMENTS:  Patient ws unable to tolerate weaning of sedation.  Becomes very agitated

## 2017-11-28 NOTE — PLAN OF CARE
Problem: MECHANICAL VENTILATION  Goal: Patient will achieve/maintain normal respiratory rate/effort  Outcome: Not Met This Shift  Patient is being weaned as tolerated on ASV mode at this time.
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
## 2017-11-28 NOTE — PLAN OF CARE
skin breakdown   Outcome: Ongoing  Reposition every 2 hours     Problem: Restraint Use - Nonviolent/Non-Self-Destructive Behavior:  Goal: Absence of restraint indications  Absence of restraint indications   Outcome: Ongoing  Patient awakens momentarily and reaches for ett  Goal: Absence of restraint-related injury  Absence of restraint-related injury   Outcome: Met This Shift  No sign of restraint related injury     Problem: Nutrition  Goal: Optimal nutrition therapy  Outcome: Ongoing  Tube feeds to be started
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
## 2017-11-28 NOTE — PLAN OF CARE
Problem: Risk for Impaired Skin Integrity  Goal: Tissue integrity - skin and mucous membranes  Structural intactness and normal physiological function of skin and  mucous membranes. Outcome: Met This Shift  Patient remains on skin care prevention as patient is immobile in ICU sedated on ventilator with increased probability of skin breakdown. Will monitor closely. No new areas of skin breakdown noted this shift. Problem: Discharge Planning:  Goal: Participates in care planning  Participates in care planning   Outcome: Ongoing  Patient remains sedated on ventilator. Girlfriend and mother at bedside early in shift and actively participate in care planning. Goal: Discharged to appropriate level of care  Discharged to appropriate level of care   Outcome: Ongoing  Not appropriate for discharge at this time. Patient remains sedated on ventilator at this time. Problem: Airway Clearance - Ineffective:  Goal: Ability to maintain a clear airway will improve  Ability to maintain a clear airway will improve   Outcome: Ongoing  Patient remains sedated on ventilator with ET tube in place. Unable to wean on dayshift due to patient with shearing injury, unable to follow commands. Problem: Anxiety/Stress:  Goal: Level of anxiety will decrease  Level of anxiety will decrease   Outcome: Ongoing  Patient remains restless despite adequate sedation. Problem: Aspiration:  Goal: Absence of aspiration  Absence of aspiration   Outcome: Ongoing  No s/s of aspiration. Patient remains sedated on ventilator with OG tube to LIWS, HOB elevated at least 30 degrees. Will continue to monitor closely. Problem: Mental Status - Impaired:  Goal: Mental status will be restored to baseline  Mental status will be restored to baseline   Outcome: Ongoing  Patient remains sedated on ventilator.      Problem: Nutrition Deficit:  Goal: Ability to achieve adequate nutritional intake will improve  Ability to achieve adequate nutritional intake will improve   Outcome: Ongoing  Tube feeding recs were placed, per previous shift TF to start tomorrow. Problem: Pain:  Goal: Pain level will decrease  Pain level will decrease   Outcome: Ongoing  Patient on fentanyl gtt at this time, continue to monitor CPOT tool for pain. Goal: Recognizes and communicates pain  Recognizes and communicates pain   Outcome: Not Met This Shift  Patient remains sedated on ventilator, utilizing CPOT assessment tool to evaluate pain. Goal: Control of acute pain  Control of acute pain   Outcome: Ongoing  Fentanyl gtt currently to manage acute pain levels. Problem: Skin Integrity - Impaired:  Goal: Will show no infection signs and symptoms  Will show no infection signs and symptoms   Outcome: Ongoing  Afebrile this shift, no new s/s of infection will continue to monitor closely. Goal: Absence of new skin breakdown  Absence of new skin breakdown   Outcome: Ongoing  No new skin breakdown noted, patient remains sedated on ventilator. Problem: Restraint Use - Nonviolent/Non-Self-Destructive Behavior:  Goal: Absence of restraint indications  Absence of restraint indications   Outcome: Ongoing  Patient continues to reach intermittently for ET tube despite adequate sedation. Goal: Absence of restraint-related injury  Absence of restraint-related injury   Outcome: Met This Shift  No restraint related injury this shift, will continue to monitor while restraints in use. Problem: Nutrition  Goal: Optimal nutrition therapy  Outcome: Ongoing  Tube feedings will start tomorrow per day shift. Problem: Falls - Risk of  Goal: Absence of falls  Outcome: Met This Shift  No falls this shift, patient remains on fall precautions as he is unable to follow commands. Comments: Care plan reviewed with patient's mother. Patient's mother verbalize understanding of the plan of care and contribute to goal setting.

## 2017-11-28 NOTE — PROGRESS NOTES
1106 sedation vacation and cpap trial started  1145 patient thrashing in bed, does not follow commands, dr simms at bedside, propofol bolus ordered and to restart sedation mulugeta  1210 patient sedated and calm

## 2017-11-29 ENCOUNTER — APPOINTMENT (OUTPATIENT)
Dept: GENERAL RADIOLOGY | Age: 32
DRG: 082 | End: 2017-11-29
Payer: COMMERCIAL

## 2017-11-29 ENCOUNTER — APPOINTMENT (OUTPATIENT)
Dept: MRI IMAGING | Age: 32
DRG: 082 | End: 2017-11-29
Payer: COMMERCIAL

## 2017-11-29 LAB
ALLEN TEST: POSITIVE
ANION GAP SERPL CALCULATED.3IONS-SCNC: 10 MEQ/L (ref 8–16)
BASE EXCESS (CALCULATED): 2.9 MMOL/L (ref -2.5–2.5)
BUN BLDV-MCNC: 14 MG/DL (ref 7–22)
CALCIUM SERPL-MCNC: 8.5 MG/DL (ref 8.5–10.5)
CHLORIDE BLD-SCNC: 108 MEQ/L (ref 98–111)
CO2: 26 MEQ/L (ref 23–33)
COLLECTED BY:: ABNORMAL
COMMENT: ABNORMAL
CREAT SERPL-MCNC: 0.4 MG/DL (ref 0.4–1.2)
DEVICE: ABNORMAL
GFR SERPL CREATININE-BSD FRML MDRD: > 90 ML/MIN/1.73M2
GLUCOSE BLD-MCNC: 113 MG/DL (ref 70–108)
HCO3: 29 MMOL/L (ref 23–28)
HCT VFR BLD CALC: 40.7 % (ref 42–52)
HEMOGLOBIN: 13.6 GM/DL (ref 14–18)
IFIO2: 45
LV EF: 55 %
LVEF MODALITY: NORMAL
MAGNESIUM: 1.9 MG/DL (ref 1.6–2.4)
MCH RBC QN AUTO: 29.2 PG (ref 27–31)
MCHC RBC AUTO-ENTMCNC: 33.3 GM/DL (ref 33–37)
MCV RBC AUTO: 87.5 FL (ref 80–94)
MODE: ABNORMAL
MRSA SCREEN: NORMAL
O2 SATURATION: 97 %
PCO2: 50 MMHG (ref 35–45)
PDW BLD-RTO: 13.3 % (ref 11.5–14.5)
PH BLOOD GAS: 7.37 (ref 7.35–7.45)
PLATELET # BLD: 191 THOU/MM3 (ref 130–400)
PMV BLD AUTO: 8.6 MCM (ref 7.4–10.4)
PO2: 95 MMHG (ref 71–104)
POTASSIUM SERPL-SCNC: 3.8 MEQ/L (ref 3.5–5.2)
POTASSIUM SERPL-SCNC: 3.8 MEQ/L (ref 3.5–5.2)
RBC # BLD: 4.65 MILL/MM3 (ref 4.7–6.1)
SODIUM BLD-SCNC: 144 MEQ/L (ref 135–145)
SOURCE, BLOOD GAS: ABNORMAL
TROPONIN T: < 0.01 NG/ML
WBC # BLD: 7.9 THOU/MM3 (ref 4.8–10.8)

## 2017-11-29 PROCEDURE — 6370000000 HC RX 637 (ALT 250 FOR IP): Performed by: INTERNAL MEDICINE

## 2017-11-29 PROCEDURE — 70551 MRI BRAIN STEM W/O DYE: CPT

## 2017-11-29 PROCEDURE — 2700000000 HC OXYGEN THERAPY PER DAY

## 2017-11-29 PROCEDURE — 36600 WITHDRAWAL OF ARTERIAL BLOOD: CPT

## 2017-11-29 PROCEDURE — 84484 ASSAY OF TROPONIN QUANT: CPT

## 2017-11-29 PROCEDURE — C9113 INJ PANTOPRAZOLE SODIUM, VIA: HCPCS | Performed by: SURGERY

## 2017-11-29 PROCEDURE — 6370000000 HC RX 637 (ALT 250 FOR IP): Performed by: PHYSICIAN ASSISTANT

## 2017-11-29 PROCEDURE — 84132 ASSAY OF SERUM POTASSIUM: CPT

## 2017-11-29 PROCEDURE — 2500000003 HC RX 250 WO HCPCS: Performed by: INTERNAL MEDICINE

## 2017-11-29 PROCEDURE — 6360000002 HC RX W HCPCS: Performed by: INTERNAL MEDICINE

## 2017-11-29 PROCEDURE — 99291 CRITICAL CARE FIRST HOUR: CPT | Performed by: INTERNAL MEDICINE

## 2017-11-29 PROCEDURE — 2580000003 HC RX 258: Performed by: INTERNAL MEDICINE

## 2017-11-29 PROCEDURE — 71010 XR CHEST PORTABLE: CPT

## 2017-11-29 PROCEDURE — 72141 MRI NECK SPINE W/O DYE: CPT

## 2017-11-29 PROCEDURE — 85027 COMPLETE CBC AUTOMATED: CPT

## 2017-11-29 PROCEDURE — 82803 BLOOD GASES ANY COMBINATION: CPT

## 2017-11-29 PROCEDURE — 94640 AIRWAY INHALATION TREATMENT: CPT

## 2017-11-29 PROCEDURE — 99233 SBSQ HOSP IP/OBS HIGH 50: CPT | Performed by: SURGERY

## 2017-11-29 PROCEDURE — 93306 TTE W/DOPPLER COMPLETE: CPT

## 2017-11-29 PROCEDURE — 2000000000 HC ICU R&B

## 2017-11-29 PROCEDURE — 36415 COLL VENOUS BLD VENIPUNCTURE: CPT

## 2017-11-29 PROCEDURE — 6360000002 HC RX W HCPCS: Performed by: SURGERY

## 2017-11-29 PROCEDURE — 2580000003 HC RX 258: Performed by: SURGERY

## 2017-11-29 PROCEDURE — 6370000000 HC RX 637 (ALT 250 FOR IP): Performed by: NURSE PRACTITIONER

## 2017-11-29 PROCEDURE — 99999 PR OFFICE/OUTPT VISIT,PROCEDURE ONLY: CPT | Performed by: NURSE PRACTITIONER

## 2017-11-29 PROCEDURE — 83735 ASSAY OF MAGNESIUM: CPT

## 2017-11-29 PROCEDURE — 80048 BASIC METABOLIC PNL TOTAL CA: CPT

## 2017-11-29 PROCEDURE — 94003 VENT MGMT INPAT SUBQ DAY: CPT

## 2017-11-29 RX ORDER — FAMOTIDINE 20 MG/1
20 TABLET, FILM COATED ORAL 2 TIMES DAILY
Status: DISCONTINUED | OUTPATIENT
Start: 2017-11-29 | End: 2017-12-01

## 2017-11-29 RX ADMIN — Medication 15 ML: at 21:55

## 2017-11-29 RX ADMIN — IPRATROPIUM BROMIDE AND ALBUTEROL SULFATE 1 AMPULE: .5; 3 SOLUTION RESPIRATORY (INHALATION) at 00:16

## 2017-11-29 RX ADMIN — Medication 10 ML: at 08:58

## 2017-11-29 RX ADMIN — PROPOFOL 35 MCG/KG/MIN: 10 INJECTION, EMULSION INTRAVENOUS at 03:08

## 2017-11-29 RX ADMIN — SODIUM CHLORIDE: 9 INJECTION, SOLUTION INTRAVENOUS at 05:55

## 2017-11-29 RX ADMIN — AMPICILLIN SODIUM AND SULBACTAM SODIUM 3 G: 2; 1 INJECTION, POWDER, FOR SOLUTION INTRAMUSCULAR; INTRAVENOUS at 08:58

## 2017-11-29 RX ADMIN — PANTOPRAZOLE SODIUM 40 MG: 40 INJECTION, POWDER, FOR SOLUTION INTRAVENOUS at 08:58

## 2017-11-29 RX ADMIN — ENOXAPARIN SODIUM 40 MG: 40 INJECTION SUBCUTANEOUS at 08:57

## 2017-11-29 RX ADMIN — Medication 15 ML: at 08:57

## 2017-11-29 RX ADMIN — PROPOFOL 30 MCG/KG/MIN: 10 INJECTION, EMULSION INTRAVENOUS at 20:09

## 2017-11-29 RX ADMIN — IPRATROPIUM BROMIDE AND ALBUTEROL SULFATE 1 AMPULE: .5; 3 SOLUTION RESPIRATORY (INHALATION) at 19:27

## 2017-11-29 RX ADMIN — FAMOTIDINE 20 MG: 20 TABLET, FILM COATED ORAL at 21:55

## 2017-11-29 RX ADMIN — DOCUSATE SODIUM 100 MG: 50 LIQUID ORAL at 21:55

## 2017-11-29 RX ADMIN — FENTANYL CITRATE 75 MCG/HR: 50 INJECTION, SOLUTION INTRAMUSCULAR; INTRAVENOUS at 21:55

## 2017-11-29 RX ADMIN — BACITRACIN ZINC 1 G: 500 OINTMENT TOPICAL at 21:55

## 2017-11-29 RX ADMIN — AMPICILLIN SODIUM AND SULBACTAM SODIUM 3 G: 2; 1 INJECTION, POWDER, FOR SOLUTION INTRAMUSCULAR; INTRAVENOUS at 16:32

## 2017-11-29 RX ADMIN — Medication 10 ML: at 08:59

## 2017-11-29 RX ADMIN — DEXMEDETOMIDINE HYDROCHLORIDE 0.5 MCG/KG/HR: 100 INJECTION, SOLUTION INTRAVENOUS at 14:31

## 2017-11-29 RX ADMIN — PROPOFOL 40 MCG/KG/MIN: 10 INJECTION, EMULSION INTRAVENOUS at 07:05

## 2017-11-29 RX ADMIN — SODIUM CHLORIDE: 9 INJECTION, SOLUTION INTRAVENOUS at 16:37

## 2017-11-29 RX ADMIN — DOCUSATE SODIUM 100 MG: 50 LIQUID ORAL at 08:59

## 2017-11-29 RX ADMIN — Medication 10 ML: at 21:58

## 2017-11-29 RX ADMIN — IPRATROPIUM BROMIDE AND ALBUTEROL SULFATE 1 AMPULE: .5; 3 SOLUTION RESPIRATORY (INHALATION) at 11:40

## 2017-11-29 RX ADMIN — DEXMEDETOMIDINE HYDROCHLORIDE 0.2 MCG/KG/HR: 100 INJECTION, SOLUTION INTRAVENOUS at 09:04

## 2017-11-29 RX ADMIN — IPRATROPIUM BROMIDE AND ALBUTEROL SULFATE 1 AMPULE: .5; 3 SOLUTION RESPIRATORY (INHALATION) at 07:58

## 2017-11-29 RX ADMIN — PROPOFOL 35 MCG/KG/MIN: 10 INJECTION, EMULSION INTRAVENOUS at 16:28

## 2017-11-29 RX ADMIN — IPRATROPIUM BROMIDE AND ALBUTEROL SULFATE 1 AMPULE: .5; 3 SOLUTION RESPIRATORY (INHALATION) at 04:34

## 2017-11-29 RX ADMIN — FENTANYL CITRATE 75 MCG/HR: 50 INJECTION, SOLUTION INTRAMUSCULAR; INTRAVENOUS at 16:27

## 2017-11-29 RX ADMIN — FENTANYL CITRATE 75 MCG/HR: 50 INJECTION, SOLUTION INTRAMUSCULAR; INTRAVENOUS at 02:58

## 2017-11-29 RX ADMIN — AMPICILLIN SODIUM AND SULBACTAM SODIUM 3 G: 2; 1 INJECTION, POWDER, FOR SOLUTION INTRAMUSCULAR; INTRAVENOUS at 04:45

## 2017-11-29 RX ADMIN — PROPOFOL 35 MCG/KG/MIN: 10 INJECTION, EMULSION INTRAVENOUS at 09:09

## 2017-11-29 RX ADMIN — PROPOFOL 35 MCG/KG/MIN: 10 INJECTION, EMULSION INTRAVENOUS at 12:14

## 2017-11-29 RX ADMIN — Medication 17.6 MG: at 21:54

## 2017-11-29 RX ADMIN — AMPICILLIN SODIUM AND SULBACTAM SODIUM 3 G: 2; 1 INJECTION, POWDER, FOR SOLUTION INTRAMUSCULAR; INTRAVENOUS at 22:01

## 2017-11-29 RX ADMIN — BACITRACIN ZINC 1 G: 500 OINTMENT TOPICAL at 08:57

## 2017-11-29 RX ADMIN — IPRATROPIUM BROMIDE AND ALBUTEROL SULFATE 1 AMPULE: .5; 3 SOLUTION RESPIRATORY (INHALATION) at 16:41

## 2017-11-29 RX ADMIN — FENTANYL CITRATE 75 MCG/HR: 50 INJECTION, SOLUTION INTRAMUSCULAR; INTRAVENOUS at 09:10

## 2017-11-29 ASSESSMENT — PULMONARY FUNCTION TESTS
PIF_VALUE: 15
PIF_VALUE: 14
PIF_VALUE: 16
PIF_VALUE: 14
PIF_VALUE: 15

## 2017-11-29 NOTE — PLAN OF CARE
Problem: MECHANICAL VENTILATION  Goal: Patient will achieve/maintain normal respiratory rate/effort  Outcome: Ongoing  Not weaning from vent at this time
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
## 2017-11-29 NOTE — PROGRESS NOTES
[address] [id number]                                   PROGRESS NOTE    PATIENT NAME: Ajith Méndez                      :        1985  MED REC NO:   [id number]                           ROOM:       0015  ACCOUNT NO:   [de-identified]                           ADMIT DATE: 2017  PROVIDER:     Keith Nash. Aleisha Garcia M.D.        Dg Childs:  07:10 PM.    The patient is seen in follow up, and his family was at the bedside. I  also discussed the case with the patient's bedside nurse. The patient  earlier in the day when the sedation was lower, was trying to sit up in  bed. He was moving all four extremities and somewhat agitated. Presently,  he is more sedated. He is on the ventilator. Pupils are equally round and  reactive to light. Repeat CT scans had been stable. Suspect that he has  shearing injury. After he is off the ventilator, then we will consider MRI  of the brain. He is still on the cervical collar since his neck has not  been cleared yet. Neurosurgery service will continue to follow up as  needed in intensive care unit.         Riley Victor M.D.    D: 2017 9:04:11       T: 2017 10:12:29     [email or web address]  Job#: [id number]     Doc#: [id number]    CC:
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
## 2017-11-29 NOTE — PLAN OF CARE
Problem: Risk for Impaired Skin Integrity  Goal: Tissue integrity - skin and mucous membranes  Structural intactness and normal physiological function of skin and  mucous membranes. Outcome: Ongoing  Patient turn q2h. Pillow support. Problem: Discharge Planning:  Goal: Participates in care planning  Participates in care planning   Outcome: Ongoing  Patient continues in ICU. Unable to transfer. Problem: Airway Clearance - Ineffective:  Goal: Ability to maintain a clear airway will improve  Ability to maintain a clear airway will improve   Outcome: Ongoing  Ventilator support continued. Problem: Mental Status - Impaired:  Goal: Mental status will be restored to baseline  Mental status will be restored to baseline   Outcome: Ongoing  q4h neuro checks. Problem: Restraint Use - Nonviolent/Non-Self-Destructive Behavior:  Goal: Absence of restraint indications  Absence of restraint indications   Outcome: Ongoing  Soft wrist restraints remain in place due to patient pulling at lines and tubes despite sedation. Goal: Absence of restraint-related injury  Absence of restraint-related injury   Outcome: Ongoing  Passive ROM. Problem: Nutrition  Goal: Optimal nutrition therapy  Outcome: Ongoing  Tube feeding continued. Problem: Falls - Risk of  Goal: Absence of falls  Outcome: Ongoing  Falling star program continued. Hourly rounding continued. Comments: Care plan reviewed with mom. Mom verbalize understanding of the plan of care and contribute to goal setting.
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
## 2017-11-29 NOTE — PROGRESS NOTES
2002- Assessment completed. Pt remains sedated on ventilator. Responding to pain at this time. 8289- Assessment completed. Pt not responding to pain at this time. Diprivan lowered (see eMAR). Assessment otherwise unchanged. 0024- Pt bradys down into the 40's. Precedex stopped at this time. STAT EKG ordered. Paged Dr. Lilly Chaudhry to update. 5873- EKG at bedside. 0050-Dr. Lilly Chaudhry states to discontinue Precidex and if there is no change in HR discontinue the Diprivan and start Ativan. Received orders for labs and ECHO in the morning.

## 2017-11-30 LAB
ALLEN TEST: POSITIVE
ANION GAP SERPL CALCULATED.3IONS-SCNC: 11 MEQ/L (ref 8–16)
BASE EXCESS (CALCULATED): 1.9 MMOL/L (ref -2.5–2.5)
BUN BLDV-MCNC: 13 MG/DL (ref 7–22)
CALCIUM SERPL-MCNC: 8.4 MG/DL (ref 8.5–10.5)
CHLORIDE BLD-SCNC: 106 MEQ/L (ref 98–111)
CO2: 26 MEQ/L (ref 23–33)
COLLECTED BY:: NORMAL
CREAT SERPL-MCNC: 0.4 MG/DL (ref 0.4–1.2)
DEVICE: NORMAL
GFR SERPL CREATININE-BSD FRML MDRD: > 90 ML/MIN/1.73M2
GLUCOSE BLD-MCNC: 96 MG/DL (ref 70–108)
HCO3: 27 MMOL/L (ref 23–28)
HCT VFR BLD CALC: 41.5 % (ref 42–52)
HEMOGLOBIN: 14 GM/DL (ref 14–18)
IFIO2: 35
MCH RBC QN AUTO: 29.5 PG (ref 27–31)
MCHC RBC AUTO-ENTMCNC: 33.8 GM/DL (ref 33–37)
MCV RBC AUTO: 87.3 FL (ref 80–94)
O2 SATURATION: 95 %
PCO2: 45 MMHG (ref 35–45)
PDW BLD-RTO: 12.9 % (ref 11.5–14.5)
PH BLOOD GAS: 7.39 (ref 7.35–7.45)
PLATELET # BLD: 189 THOU/MM3 (ref 130–400)
PMV BLD AUTO: 8.5 MCM (ref 7.4–10.4)
PO2: 79 MMHG (ref 71–104)
POTASSIUM SERPL-SCNC: 3.5 MEQ/L (ref 3.5–5.2)
RBC # BLD: 4.76 MILL/MM3 (ref 4.7–6.1)
SET PEEP: 5 MMHG
SODIUM BLD-SCNC: 143 MEQ/L (ref 135–145)
SOURCE, BLOOD GAS: NORMAL
WBC # BLD: 5 THOU/MM3 (ref 4.8–10.8)

## 2017-11-30 PROCEDURE — 94640 AIRWAY INHALATION TREATMENT: CPT

## 2017-11-30 PROCEDURE — 36415 COLL VENOUS BLD VENIPUNCTURE: CPT

## 2017-11-30 PROCEDURE — 6370000000 HC RX 637 (ALT 250 FOR IP): Performed by: INTERNAL MEDICINE

## 2017-11-30 PROCEDURE — 2580000003 HC RX 258: Performed by: SURGERY

## 2017-11-30 PROCEDURE — 2000000000 HC ICU R&B

## 2017-11-30 PROCEDURE — 6360000002 HC RX W HCPCS: Performed by: SURGERY

## 2017-11-30 PROCEDURE — 2580000003 HC RX 258: Performed by: INTERNAL MEDICINE

## 2017-11-30 PROCEDURE — 2500000003 HC RX 250 WO HCPCS: Performed by: INTERNAL MEDICINE

## 2017-11-30 PROCEDURE — 80048 BASIC METABOLIC PNL TOTAL CA: CPT

## 2017-11-30 PROCEDURE — 2700000000 HC OXYGEN THERAPY PER DAY

## 2017-11-30 PROCEDURE — 99291 CRITICAL CARE FIRST HOUR: CPT | Performed by: INTERNAL MEDICINE

## 2017-11-30 PROCEDURE — 85027 COMPLETE CBC AUTOMATED: CPT

## 2017-11-30 PROCEDURE — 99233 SBSQ HOSP IP/OBS HIGH 50: CPT | Performed by: SURGERY

## 2017-11-30 PROCEDURE — 6360000002 HC RX W HCPCS: Performed by: INTERNAL MEDICINE

## 2017-11-30 PROCEDURE — 36600 WITHDRAWAL OF ARTERIAL BLOOD: CPT

## 2017-11-30 PROCEDURE — 82803 BLOOD GASES ANY COMBINATION: CPT

## 2017-11-30 PROCEDURE — 6370000000 HC RX 637 (ALT 250 FOR IP): Performed by: PHYSICIAN ASSISTANT

## 2017-11-30 PROCEDURE — 94003 VENT MGMT INPAT SUBQ DAY: CPT

## 2017-11-30 PROCEDURE — 99999 PR OFFICE/OUTPT VISIT,PROCEDURE ONLY: CPT | Performed by: PHYSICIAN ASSISTANT

## 2017-11-30 PROCEDURE — 6370000000 HC RX 637 (ALT 250 FOR IP): Performed by: NURSE PRACTITIONER

## 2017-11-30 RX ADMIN — IPRATROPIUM BROMIDE AND ALBUTEROL SULFATE 1 AMPULE: .5; 3 SOLUTION RESPIRATORY (INHALATION) at 04:26

## 2017-11-30 RX ADMIN — DOCUSATE SODIUM 100 MG: 50 LIQUID ORAL at 09:54

## 2017-11-30 RX ADMIN — IPRATROPIUM BROMIDE AND ALBUTEROL SULFATE 1 AMPULE: .5; 3 SOLUTION RESPIRATORY (INHALATION) at 07:54

## 2017-11-30 RX ADMIN — PROPOFOL 30 MCG/KG/MIN: 10 INJECTION, EMULSION INTRAVENOUS at 23:42

## 2017-11-30 RX ADMIN — FAMOTIDINE 20 MG: 20 TABLET, FILM COATED ORAL at 09:54

## 2017-11-30 RX ADMIN — IPRATROPIUM BROMIDE AND ALBUTEROL SULFATE 1 AMPULE: .5; 3 SOLUTION RESPIRATORY (INHALATION) at 00:33

## 2017-11-30 RX ADMIN — AMPICILLIN SODIUM AND SULBACTAM SODIUM 3 G: 2; 1 INJECTION, POWDER, FOR SOLUTION INTRAMUSCULAR; INTRAVENOUS at 23:45

## 2017-11-30 RX ADMIN — AMPICILLIN SODIUM AND SULBACTAM SODIUM 3 G: 2; 1 INJECTION, POWDER, FOR SOLUTION INTRAMUSCULAR; INTRAVENOUS at 05:21

## 2017-11-30 RX ADMIN — DEXMEDETOMIDINE HYDROCHLORIDE 0.9 MCG/KG/HR: 100 INJECTION, SOLUTION INTRAVENOUS at 14:49

## 2017-11-30 RX ADMIN — IPRATROPIUM BROMIDE AND ALBUTEROL SULFATE 1 AMPULE: .5; 3 SOLUTION RESPIRATORY (INHALATION) at 15:21

## 2017-11-30 RX ADMIN — FENTANYL CITRATE 75 MCG/HR: 50 INJECTION, SOLUTION INTRAMUSCULAR; INTRAVENOUS at 17:38

## 2017-11-30 RX ADMIN — BACITRACIN ZINC 1 G: 500 OINTMENT TOPICAL at 20:54

## 2017-11-30 RX ADMIN — Medication 15 ML: at 20:53

## 2017-11-30 RX ADMIN — SODIUM CHLORIDE: 9 INJECTION, SOLUTION INTRAVENOUS at 19:39

## 2017-11-30 RX ADMIN — ENOXAPARIN SODIUM 40 MG: 40 INJECTION SUBCUTANEOUS at 09:54

## 2017-11-30 RX ADMIN — SODIUM CHLORIDE: 9 INJECTION, SOLUTION INTRAVENOUS at 02:02

## 2017-11-30 RX ADMIN — IPRATROPIUM BROMIDE AND ALBUTEROL SULFATE 1 AMPULE: .5; 3 SOLUTION RESPIRATORY (INHALATION) at 21:52

## 2017-11-30 RX ADMIN — FENTANYL CITRATE 75 MCG/HR: 50 INJECTION, SOLUTION INTRAMUSCULAR; INTRAVENOUS at 05:46

## 2017-11-30 RX ADMIN — DEXMEDETOMIDINE HYDROCHLORIDE 0.6 MCG/KG/HR: 100 INJECTION, SOLUTION INTRAVENOUS at 05:57

## 2017-11-30 RX ADMIN — SODIUM CHLORIDE: 9 INJECTION, SOLUTION INTRAVENOUS at 09:55

## 2017-11-30 RX ADMIN — FAMOTIDINE 20 MG: 20 TABLET, FILM COATED ORAL at 20:53

## 2017-11-30 RX ADMIN — Medication 17.6 MG: at 20:53

## 2017-11-30 RX ADMIN — DOCUSATE SODIUM 100 MG: 50 LIQUID ORAL at 20:53

## 2017-11-30 RX ADMIN — DEXMEDETOMIDINE HYDROCHLORIDE 0.8 MCG/KG/HR: 100 INJECTION, SOLUTION INTRAVENOUS at 11:06

## 2017-11-30 RX ADMIN — DEXMEDETOMIDINE HYDROCHLORIDE 0.9 MCG/KG/HR: 100 INJECTION, SOLUTION INTRAVENOUS at 17:38

## 2017-11-30 RX ADMIN — Medication 10 ML: at 20:53

## 2017-11-30 RX ADMIN — FENTANYL CITRATE 75 MCG/HR: 50 INJECTION, SOLUTION INTRAMUSCULAR; INTRAVENOUS at 11:06

## 2017-11-30 RX ADMIN — Medication 15 ML: at 09:54

## 2017-11-30 RX ADMIN — DEXMEDETOMIDINE HYDROCHLORIDE 0.5 MCG/KG/HR: 100 INJECTION, SOLUTION INTRAVENOUS at 00:51

## 2017-11-30 RX ADMIN — PROPOFOL 30 MCG/KG/MIN: 10 INJECTION, EMULSION INTRAVENOUS at 20:35

## 2017-11-30 RX ADMIN — IPRATROPIUM BROMIDE AND ALBUTEROL SULFATE 1 AMPULE: .5; 3 SOLUTION RESPIRATORY (INHALATION) at 11:57

## 2017-11-30 RX ADMIN — AMPICILLIN SODIUM AND SULBACTAM SODIUM 3 G: 2; 1 INJECTION, POWDER, FOR SOLUTION INTRAMUSCULAR; INTRAVENOUS at 17:47

## 2017-11-30 RX ADMIN — DEXMEDETOMIDINE HYDROCHLORIDE 0.9 MCG/KG/HR: 100 INJECTION, SOLUTION INTRAVENOUS at 21:19

## 2017-11-30 RX ADMIN — BACITRACIN ZINC 1 G: 500 OINTMENT TOPICAL at 09:54

## 2017-11-30 RX ADMIN — AMPICILLIN SODIUM AND SULBACTAM SODIUM 3 G: 2; 1 INJECTION, POWDER, FOR SOLUTION INTRAMUSCULAR; INTRAVENOUS at 09:55

## 2017-11-30 RX ADMIN — PROPOFOL 20 MCG/KG/MIN: 10 INJECTION, EMULSION INTRAVENOUS at 00:31

## 2017-11-30 ASSESSMENT — PULMONARY FUNCTION TESTS
PIF_VALUE: 17
PIF_VALUE: 14
PIF_VALUE: 14
PIF_VALUE: 15
PIF_VALUE: 15
PIF_VALUE: 14

## 2017-11-30 NOTE — PROGRESS NOTES
11/30/17 9:16 AM    SBIRT consult attempted, patient unable to participate due to intubation.      KRISTOFER Landaverde
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
## 2017-11-30 NOTE — PLAN OF CARE
Problem: Risk for Impaired Skin Integrity  Goal: Tissue integrity - skin and mucous membranes  Structural intactness and normal physiological function of skin and  mucous membranes. Outcome: Ongoing  Pt was repositioned and turned to be pro-active in preventing breakdown. Problem: Discharge Planning:  Goal: Participates in care planning  Participates in care planning   Outcome: Ongoing  Pt is not able due to intubation, but mother is actively involved. Goal: Discharged to appropriate level of care  Discharged to appropriate level of care   Outcome: Not Met This Shift  Pt not a candidate for discharge at this time. Problem: Airway Clearance - Ineffective:  Goal: Ability to maintain a clear airway will improve  Ability to maintain a clear airway will improve   Outcome: Ongoing  Pt is currently intubated. Problem: Anxiety/Stress:  Goal: Level of anxiety will decrease  Level of anxiety will decrease   Outcome: Ongoing  Vital signs have been monitored to assess for signs of anxiety. Problem: Aspiration:  Goal: Absence of aspiration  Absence of aspiration   Outcome: Ongoing  Pt is currently intubated. Problem: Mental Status - Impaired:  Goal: Mental status will be restored to baseline  Mental status will be restored to baseline   Outcome: Ongoing  Unable to assess due to intubation. Problem: Nutrition Deficit:  Goal: Ability to achieve adequate nutritional intake will improve  Ability to achieve adequate nutritional intake will improve   Outcome: Ongoing  Pt was at goal on tube feed, but then held due to high residuals. Problem: Pain:  Goal: Pain level will decrease  Pain level will decrease   Outcome: Ongoing  Pt being monitored for signs and symptoms of pain.      Problem: Restraint Use - Nonviolent/Non-Self-Destructive Behavior:  Goal: Absence of restraint indications  Absence of restraint indications   Outcome: Ongoing  Pt is constantly being evaluated for the need of the restraints or for the chance for removal.   Goal: Absence of restraint-related injury  Absence of restraint-related injury   Outcome: Ongoing  Pt shows no signs of a restraint related injury. Problem: Nutrition  Goal: Optimal nutrition therapy  Outcome: Ongoing  Currently, tube feeding is being held due to high residuals. Comments: Pt is unable to voice objection or acceptance to plan of care, but mother has been actively involved in care.
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
## 2017-11-30 NOTE — PLAN OF CARE
Problem: MECHANICAL VENTILATION  Goal: Patient will achieve/maintain normal respiratory rate/effort  Outcome: Ongoing  Patient remains on ASV of 90%, tolerating well at this time.
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
## 2017-11-30 NOTE — PLAN OF CARE
Problem: Risk for Impaired Skin Integrity  Goal: Tissue integrity - skin and mucous membranes  Structural intactness and normal physiological function of skin and  mucous membranes. Outcome: Ongoing  Patient turn q2h. Pillow support. Problem: Discharge Planning:  Goal: Participates in care planning  Participates in care planning   Outcome: Ongoing  Continues in ICU. Unable to transfer. Problem: Nutrition Deficit:  Goal: Ability to achieve adequate nutritional intake will improve  Ability to achieve adequate nutritional intake will improve   Outcome: Ongoing  Tube feedings restarted. Problem: Skin Integrity - Impaired:  Goal: Will show no infection signs and symptoms  Will show no infection signs and symptoms   Outcome: Ongoing  Patient turn q2h. Pillow support. Problem: Restraint Use - Nonviolent/Non-Self-Destructive Behavior:  Goal: Absence of restraint indications  Absence of restraint indications   Outcome: Completed Date Met: 11/30/17  Soft wrist restraints remain in place due to patient pulling at lines and tubes despite sedation. Goal: Absence of restraint-related injury  Absence of restraint-related injury   Outcome: Ongoing  Passive ROM x4    Problem: Falls - Risk of  Goal: Absence of falls  Outcome: Ongoing  Falling star program continued. Hourly rounding continued. Comments: Care plan reviewed with mother. Mother verbalizes understanding of the plan of care and contribute to goal setting.

## 2017-12-01 LAB
ALLEN TEST: ABNORMAL
ALLEN TEST: POSITIVE
ANION GAP SERPL CALCULATED.3IONS-SCNC: 14 MEQ/L (ref 8–16)
BASE EXCESS (CALCULATED): 1.5 MMOL/L (ref -2.5–2.5)
BASE EXCESS (CALCULATED): 3.1 MMOL/L (ref -2.5–2.5)
BUN BLDV-MCNC: 12 MG/DL (ref 7–22)
CALCIUM SERPL-MCNC: 8.4 MG/DL (ref 8.5–10.5)
CHLORIDE BLD-SCNC: 105 MEQ/L (ref 98–111)
CO2: 26 MEQ/L (ref 23–33)
COLLECTED BY:: ABNORMAL
COLLECTED BY:: ABNORMAL
COMMENT: ABNORMAL
CREAT SERPL-MCNC: 0.4 MG/DL (ref 0.4–1.2)
DEVICE: ABNORMAL
DEVICE: ABNORMAL
GFR SERPL CREATININE-BSD FRML MDRD: > 90 ML/MIN/1.73M2
GLUCOSE BLD-MCNC: 96 MG/DL (ref 70–108)
HCO3: 24 MMOL/L (ref 23–28)
HCO3: 28 MMOL/L (ref 23–28)
HCT VFR BLD CALC: 41.1 % (ref 42–52)
HEMOGLOBIN: 13.9 GM/DL (ref 14–18)
IFIO2: 30
IFIO2: 35
MCH RBC QN AUTO: 29.5 PG (ref 27–31)
MCHC RBC AUTO-ENTMCNC: 33.8 GM/DL (ref 33–37)
MCV RBC AUTO: 87 FL (ref 80–94)
O2 SATURATION: 95 %
O2 SATURATION: 99 %
PCO2: 31 MMHG (ref 35–45)
PCO2: 42 MMHG (ref 35–45)
PDW BLD-RTO: 12.6 % (ref 11.5–14.5)
PH BLOOD GAS: 7.43 (ref 7.35–7.45)
PH BLOOD GAS: 7.5 (ref 7.35–7.45)
PLATELET # BLD: 198 THOU/MM3 (ref 130–400)
PMV BLD AUTO: 8.2 MCM (ref 7.4–10.4)
PO2: 111 MMHG (ref 71–104)
PO2: 75 MMHG (ref 71–104)
POTASSIUM SERPL-SCNC: 3.4 MEQ/L (ref 3.5–5.2)
RBC # BLD: 4.73 MILL/MM3 (ref 4.7–6.1)
SET PEEP: 5 MMHG
SET PEEP: 6 MMHG
SET PRESS SUPP: 12 CMH2O
SODIUM BLD-SCNC: 145 MEQ/L (ref 135–145)
SOURCE, BLOOD GAS: ABNORMAL
SOURCE, BLOOD GAS: ABNORMAL
WBC # BLD: 5.5 THOU/MM3 (ref 4.8–10.8)

## 2017-12-01 PROCEDURE — 2580000003 HC RX 258: Performed by: INTERNAL MEDICINE

## 2017-12-01 PROCEDURE — 6360000002 HC RX W HCPCS: Performed by: INTERNAL MEDICINE

## 2017-12-01 PROCEDURE — 6370000000 HC RX 637 (ALT 250 FOR IP): Performed by: INTERNAL MEDICINE

## 2017-12-01 PROCEDURE — 99291 CRITICAL CARE FIRST HOUR: CPT | Performed by: INTERNAL MEDICINE

## 2017-12-01 PROCEDURE — 6370000000 HC RX 637 (ALT 250 FOR IP): Performed by: PHYSICIAN ASSISTANT

## 2017-12-01 PROCEDURE — S0028 INJECTION, FAMOTIDINE, 20 MG: HCPCS | Performed by: INTERNAL MEDICINE

## 2017-12-01 PROCEDURE — 80048 BASIC METABOLIC PNL TOTAL CA: CPT

## 2017-12-01 PROCEDURE — 82803 BLOOD GASES ANY COMBINATION: CPT

## 2017-12-01 PROCEDURE — 2700000000 HC OXYGEN THERAPY PER DAY

## 2017-12-01 PROCEDURE — 2580000003 HC RX 258: Performed by: SURGERY

## 2017-12-01 PROCEDURE — 2500000003 HC RX 250 WO HCPCS: Performed by: INTERNAL MEDICINE

## 2017-12-01 PROCEDURE — 6370000000 HC RX 637 (ALT 250 FOR IP): Performed by: NURSE PRACTITIONER

## 2017-12-01 PROCEDURE — 6360000002 HC RX W HCPCS: Performed by: SURGERY

## 2017-12-01 PROCEDURE — 94003 VENT MGMT INPAT SUBQ DAY: CPT

## 2017-12-01 PROCEDURE — 36600 WITHDRAWAL OF ARTERIAL BLOOD: CPT

## 2017-12-01 PROCEDURE — 94640 AIRWAY INHALATION TREATMENT: CPT

## 2017-12-01 PROCEDURE — 36415 COLL VENOUS BLD VENIPUNCTURE: CPT

## 2017-12-01 PROCEDURE — 99233 SBSQ HOSP IP/OBS HIGH 50: CPT | Performed by: SURGERY

## 2017-12-01 PROCEDURE — 6360000002 HC RX W HCPCS

## 2017-12-01 PROCEDURE — 85027 COMPLETE CBC AUTOMATED: CPT

## 2017-12-01 PROCEDURE — 2000000000 HC ICU R&B

## 2017-12-01 RX ORDER — SODIUM CHLORIDE, SODIUM LACTATE, POTASSIUM CHLORIDE, CALCIUM CHLORIDE 600; 310; 30; 20 MG/100ML; MG/100ML; MG/100ML; MG/100ML
INJECTION, SOLUTION INTRAVENOUS CONTINUOUS
Status: DISCONTINUED | OUTPATIENT
Start: 2017-12-01 | End: 2017-12-01

## 2017-12-01 RX ORDER — HALOPERIDOL 5 MG/ML
5 INJECTION INTRAMUSCULAR ONCE
Status: COMPLETED | OUTPATIENT
Start: 2017-12-01 | End: 2017-12-01

## 2017-12-01 RX ORDER — HALOPERIDOL 5 MG/ML
5 INJECTION INTRAMUSCULAR EVERY 6 HOURS PRN
Status: DISCONTINUED | OUTPATIENT
Start: 2017-12-01 | End: 2017-12-08 | Stop reason: HOSPADM

## 2017-12-01 RX ORDER — BISACODYL 10 MG
10 SUPPOSITORY, RECTAL RECTAL DAILY PRN
Status: DISCONTINUED | OUTPATIENT
Start: 2017-12-01 | End: 2017-12-08 | Stop reason: HOSPADM

## 2017-12-01 RX ORDER — HYDRALAZINE HYDROCHLORIDE 20 MG/ML
20 INJECTION INTRAMUSCULAR; INTRAVENOUS EVERY 6 HOURS PRN
Status: DISCONTINUED | OUTPATIENT
Start: 2017-12-01 | End: 2017-12-08 | Stop reason: HOSPADM

## 2017-12-01 RX ORDER — SODIUM CHLORIDE 9 MG/ML
INJECTION, SOLUTION INTRAVENOUS CONTINUOUS
Status: DISCONTINUED | OUTPATIENT
Start: 2017-12-01 | End: 2017-12-04

## 2017-12-01 RX ORDER — HYDRALAZINE HYDROCHLORIDE 20 MG/ML
INJECTION INTRAMUSCULAR; INTRAVENOUS
Status: COMPLETED
Start: 2017-12-01 | End: 2017-12-01

## 2017-12-01 RX ORDER — ACETAMINOPHEN 650 MG/1
650 SUPPOSITORY RECTAL EVERY 4 HOURS PRN
Status: DISCONTINUED | OUTPATIENT
Start: 2017-12-01 | End: 2017-12-08 | Stop reason: HOSPADM

## 2017-12-01 RX ADMIN — IPRATROPIUM BROMIDE AND ALBUTEROL SULFATE 1 AMPULE: .5; 3 SOLUTION RESPIRATORY (INHALATION) at 12:25

## 2017-12-01 RX ADMIN — FAMOTIDINE 20 MG: 20 TABLET, FILM COATED ORAL at 08:39

## 2017-12-01 RX ADMIN — HALOPERIDOL LACTATE 5 MG: 5 INJECTION, SOLUTION INTRAMUSCULAR at 21:13

## 2017-12-01 RX ADMIN — BACITRACIN ZINC 1 G: 500 OINTMENT TOPICAL at 08:39

## 2017-12-01 RX ADMIN — DOCUSATE SODIUM 100 MG: 50 LIQUID ORAL at 08:39

## 2017-12-01 RX ADMIN — ACETAMINOPHEN 650 MG: 650 SUPPOSITORY RECTAL at 22:04

## 2017-12-01 RX ADMIN — FAMOTIDINE 20 MG: 10 INJECTION, SOLUTION INTRAVENOUS at 22:04

## 2017-12-01 RX ADMIN — AMPICILLIN SODIUM AND SULBACTAM SODIUM 3 G: 2; 1 INJECTION, POWDER, FOR SOLUTION INTRAMUSCULAR; INTRAVENOUS at 18:31

## 2017-12-01 RX ADMIN — DEXMEDETOMIDINE HYDROCHLORIDE 0.9 MCG/KG/HR: 100 INJECTION, SOLUTION INTRAVENOUS at 04:07

## 2017-12-01 RX ADMIN — IPRATROPIUM BROMIDE AND ALBUTEROL SULFATE 1 AMPULE: .5; 3 SOLUTION RESPIRATORY (INHALATION) at 08:29

## 2017-12-01 RX ADMIN — DEXMEDETOMIDINE HYDROCHLORIDE 1.2 MCG/KG/HR: 100 INJECTION, SOLUTION INTRAVENOUS at 10:14

## 2017-12-01 RX ADMIN — METHYLNALTREXONE BROMIDE 12 MG: 12 INJECTION, SOLUTION SUBCUTANEOUS at 20:16

## 2017-12-01 RX ADMIN — DEXMEDETOMIDINE HYDROCHLORIDE 1.4 MCG/KG/HR: 100 INJECTION, SOLUTION INTRAVENOUS at 23:22

## 2017-12-01 RX ADMIN — DEXMEDETOMIDINE HYDROCHLORIDE 1.4 MCG/KG/HR: 100 INJECTION, SOLUTION INTRAVENOUS at 19:02

## 2017-12-01 RX ADMIN — DEXMEDETOMIDINE HYDROCHLORIDE 1.7 MCG/KG/HR: 100 INJECTION, SOLUTION INTRAVENOUS at 12:46

## 2017-12-01 RX ADMIN — IPRATROPIUM BROMIDE AND ALBUTEROL SULFATE 1 AMPULE: .5; 3 SOLUTION RESPIRATORY (INHALATION) at 01:00

## 2017-12-01 RX ADMIN — DEXMEDETOMIDINE HYDROCHLORIDE 1.4 MCG/KG/HR: 100 INJECTION, SOLUTION INTRAVENOUS at 16:35

## 2017-12-01 RX ADMIN — HALOPERIDOL LACTATE 5 MG: 5 INJECTION, SOLUTION INTRAMUSCULAR at 14:59

## 2017-12-01 RX ADMIN — IPRATROPIUM BROMIDE AND ALBUTEROL SULFATE 1 AMPULE: .5; 3 SOLUTION RESPIRATORY (INHALATION) at 16:04

## 2017-12-01 RX ADMIN — POTASSIUM CHLORIDE 40 MEQ: 2 INJECTION, SOLUTION, CONCENTRATE INTRAVENOUS at 14:43

## 2017-12-01 RX ADMIN — FENTANYL CITRATE 75 MCG/HR: 50 INJECTION, SOLUTION INTRAMUSCULAR; INTRAVENOUS at 01:21

## 2017-12-01 RX ADMIN — PROPOFOL 30 MCG/KG/MIN: 10 INJECTION, EMULSION INTRAVENOUS at 07:42

## 2017-12-01 RX ADMIN — BACITRACIN ZINC 1 G: 500 OINTMENT TOPICAL at 21:00

## 2017-12-01 RX ADMIN — HYDRALAZINE HYDROCHLORIDE 20 MG: 20 INJECTION INTRAMUSCULAR; INTRAVENOUS at 20:11

## 2017-12-01 RX ADMIN — SODIUM CHLORIDE: 9 INJECTION, SOLUTION INTRAVENOUS at 04:07

## 2017-12-01 RX ADMIN — FENTANYL CITRATE 75 MCG/HR: 50 INJECTION, SOLUTION INTRAMUSCULAR; INTRAVENOUS at 08:39

## 2017-12-01 RX ADMIN — HYDRALAZINE HYDROCHLORIDE 20 MG: 20 INJECTION INTRAMUSCULAR; INTRAVENOUS at 16:34

## 2017-12-01 RX ADMIN — PROPOFOL 30 MCG/KG/MIN: 10 INJECTION, EMULSION INTRAVENOUS at 03:01

## 2017-12-01 RX ADMIN — Medication 10 ML: at 22:05

## 2017-12-01 RX ADMIN — DEXMEDETOMIDINE HYDROCHLORIDE 1.4 MCG/KG/HR: 100 INJECTION, SOLUTION INTRAVENOUS at 21:04

## 2017-12-01 RX ADMIN — DEXMEDETOMIDINE HYDROCHLORIDE 0.9 MCG/KG/HR: 100 INJECTION, SOLUTION INTRAVENOUS at 07:47

## 2017-12-01 RX ADMIN — IPRATROPIUM BROMIDE AND ALBUTEROL SULFATE 1 AMPULE: .5; 3 SOLUTION RESPIRATORY (INHALATION) at 21:27

## 2017-12-01 RX ADMIN — AMPICILLIN SODIUM AND SULBACTAM SODIUM 3 G: 2; 1 INJECTION, POWDER, FOR SOLUTION INTRAMUSCULAR; INTRAVENOUS at 10:14

## 2017-12-01 RX ADMIN — AMPICILLIN SODIUM AND SULBACTAM SODIUM 3 G: 2; 1 INJECTION, POWDER, FOR SOLUTION INTRAMUSCULAR; INTRAVENOUS at 23:05

## 2017-12-01 RX ADMIN — Medication 10 ML: at 10:14

## 2017-12-01 RX ADMIN — SODIUM CHLORIDE: 9 INJECTION, SOLUTION INTRAVENOUS at 13:47

## 2017-12-01 RX ADMIN — Medication 10 ML: at 20:11

## 2017-12-01 RX ADMIN — ENOXAPARIN SODIUM 40 MG: 40 INJECTION SUBCUTANEOUS at 08:39

## 2017-12-01 RX ADMIN — DEXMEDETOMIDINE HYDROCHLORIDE 0.9 MCG/KG/HR: 100 INJECTION, SOLUTION INTRAVENOUS at 01:10

## 2017-12-01 RX ADMIN — AMPICILLIN SODIUM AND SULBACTAM SODIUM 3 G: 2; 1 INJECTION, POWDER, FOR SOLUTION INTRAMUSCULAR; INTRAVENOUS at 04:08

## 2017-12-01 RX ADMIN — IPRATROPIUM BROMIDE AND ALBUTEROL SULFATE 1 AMPULE: .5; 3 SOLUTION RESPIRATORY (INHALATION) at 04:06

## 2017-12-01 ASSESSMENT — PULMONARY FUNCTION TESTS
PIF_VALUE: 14

## 2017-12-01 NOTE — PLAN OF CARE
Problem: Risk for Impaired Skin Integrity  Goal: Tissue integrity - skin and mucous membranes  Structural intactness and normal physiological function of skin and  mucous membranes. Outcome: Ongoing  Patient turn q2h. Pillow support. Problem: Discharge Planning:  Goal: Participates in care planning  Participates in care planning   Outcome: Ongoing  Continues in ICU. Unable to transfer. Problem: Nutrition Deficit:  Goal: Ability to achieve adequate nutritional intake will improve  Ability to achieve adequate nutritional intake will improve   Outcome: Ongoing  Tube feedings continued. Problem: Restraint Use - Nonviolent/Non-Self-Destructive Behavior:  Goal: Absence of restraint indications  Absence of restraint indications   Outcome: Ongoing  Soft wrist restraints remain in place due to patient pulling at lines and tubes despite redirection and Precedex drip. Goal: Absence of restraint-related injury  Absence of restraint-related injury   Outcome: Ongoing  Passive ROM x4 extremties. Problem: Falls - Risk of  Goal: Absence of falls  Outcome: Ongoing  Falling star program continued. Hourly rounding continued. Comments: Care plan reviewed with mother. Mother verbalize understanding of the plan of care and contribute to goal setting.

## 2017-12-01 NOTE — PROGRESS NOTES
Labs, Constipation    See Adult Nutrition Doc Flowsheet for more detail.      Electronically signed by Juhi Lea RD, RAISA on 12/1/17 at 2:23 PM    Contact Number: [phone]

## 2017-12-01 NOTE — PROGRESS NOTES
VENTILATOR LIBERATION PROTOCOL    PRE-TRIAL PATIENT ASSESSMENT - COMPLETED AT 0522      Ventilatory Assessment:    PARAMETER CRITERIA FOR WEANING   Reversal  of the acute disease process that prompted intubation: Yes At least partial or complete reversal   FiO2 : 35 % FIO2 less than or equal to 50%     PEEP less than or equal to 5 cm H2O   Hemodynamic stability: Yes Dopamine or Dobutamine at 5 mcg/kg/minute or less   Adequate correction of electrolytes, Hgb, and HCT: No Within lab range   Neurologic stability: No Ability to cough, voluntarily initiate ventilator effort, cooperate with pulmonary toilet measures     ABG:   Lab Results   Component Value Date    PH 7.39 11/30/2017    PO2 79 11/30/2017    PCO2 45 11/30/2017    HCO3 27 11/30/2017    O2SAT 95 11/30/2017     HGB/WBC:  Lab Results   Component Value Date    HGB 13.9 (L) 12/01/2017    WBC 5.5 12/01/2017         Vital Signs:    PARAMETER CRITERIA FOR WEANING Meets Criteria   Pulse: 76 Within patient's normal limits / stable Yes   Resp: 27 Less than or equal to 30 Yes   BP: 139/69 Within patient's normal limits / minimal pressors (Hemodynamically Stable) Yes   SpO2: 97 % Greater than or equal to 90% Yes   Temp: 100 °F (37.8 °C) Less than 38. 5oC / 101. 3oF Yes    Sedation/paralytic weaned No     []    Based on this assessment and the Ventilator Liberation Protocol, this patient IS being placed on a Spontaneous Breathing Trial (SBT) at this time. [x]    Based on this assessment and the Ventilator Liberation Protocol, this patient IS NOT being placed on a Spontaneous Breathing Trial (SBT) at this time.

## 2017-12-01 NOTE — PROGRESS NOTES
[facility] THERAPY MISSED TREATMENT NOTE  [facility] ICU 4D      Date: 2017  Patient Name: Guillermo Aparicio        MRN: [id number]    : 1985  (33 y.o.)    REASON FOR MISSED TREATMENT:    Received new order for swallowing evaluation. Patient extubated . Attempted to see patient for bedside swallow evaluation. RN Jimbo Yoder reports pt is inappropriate for swallow evaluation at this time as patient is highly agitated, nonverbal, and unable to follow some basic commands. Will check with nursing  prior to seeing patient.      PUSHPA Andrews., Speech Intern  Samantha Biggs M.S. [place] [address]

## 2017-12-01 NOTE — PLAN OF CARE
Problem: Falls - Risk of  Goal: Absence of falls  Outcome: Ongoing  Pt has remained free from falls this shift. Comments: Pt is unable to voice acceptance or disapproval of plan of care. Mother is kept informed of plan.

## 2017-12-01 NOTE — FLOWSHEET NOTE
9456 Diprivan stopped for ventilator weaning. 1010 Patient extubated to 4L NC.     1121 Patient diaphoretic and RR >40. Dr. Moshe Loera at bedside and Precedex turned up to 1.7 mcg/kg/min. 1429 Patient very restless, agitated, and RR 30-40. Precedex to  2 mcg/kg/min per Dr. Moshe Loera. 1430 Patient RR >30 and diaphoretic. Patient placed on BiPAP per Dr. Moshe Loera. 1459 Haldol given. Precedex weaned to 1.4 mcgt/kg/min due to bradycardia.

## 2017-12-01 NOTE — PROGRESS NOTES
he kept trying to pull at his Altman catheter so restraints were replaced. Mom was concerned for blood clot of ear so otoscopic exam was performed and mom was reassured her concern was simply ear wax. SLP to evaluate patient today and see how patient responds for feeding purposes. Altman output remains janae in appearance. Wt Readings from Last 3 Encounters:   12/01/17 (!) 319 lb 0.1 oz (144.7 kg)     Temp Readings from Last 3 Encounters:   12/01/17 99 °F (37.2 °C) (Esophageal)     BP Readings from Last 3 Encounters:   12/01/17 (!) 163/91     Pulse Readings from Last 3 Encounters:   12/01/17 92       24 HR INTAKE/OUTPUT :     Intake/Output Summary (Last 24 hours) at 12/01/17 1247  Last data filed at 12/01/17 0600   Gross per 24 hour   Intake           4958.8 ml   Output             2600 ml   Net           2358.8 ml   BM = 0         OBJECTIVE  CURRENT VITALS BP (!) 163/91   Pulse 92   Temp 99 °F (37.2 °C) (Esophageal)   Resp 29   Ht 6' 3\" (1.905 m)   Wt (!) 319 lb 0.1 oz (144.7 kg)   SpO2 92%   BMI 39.87 kg/m²      GENERAL: Extubated today, appears extremely aggitated, thrashing in bed    NEURO: PERRL. Moving all extremities. Responding to basic commands, remains non-verbal  LUNGS:  CTAB, no wheezes or rales, normal air movement, no respiratory distress. HEART: NSR to ST rate 100s on bedside monitor with no ectopy, normal S1 and S2, no gallops, intact distal pulses and no carotid bruits  ABDOMEN: soft, non-tender, non-distended, active normal toned bowel sounds, no masses or organomegaly  WOUNDS: right ear with extensive laceration, cartilage is visible - no active bleeding. Scattered abrasions to bilateral upper extremities, no active drainage.     EXTREMITY: No obvious deformities, no cyanosis, no clubbing and no edema      LABS  CBC :   Recent Labs      11/29/17   0747  11/30/17   0530  12/01/17   0425   WBC  7.9  5.0  5.5   HGB  13.6*  14.0  13.9*   HCT  40.7*  41.5*  41.1*   MCV  87.5  87.3  87.0 white matter of the right frontal lobe as well as the right thalamus and right temporal lobe. Associated restricted diffusion is also noted corresponding to the foci in the right frontal    lobe as well as involving the splenium of the corpus callosum. 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       2. Soft tissue swelling and a small fluid collection is noted within the subcutaneous soft tissues overlying the right temporal region and likely corresponding to a scalp hematoma.                   **This report has been created using voice recognition software. It may contain minor errors which are inherent in voice recognition technology. **       Final report electronically signed by Dr. Eric Puente on 11/29/2017 4:35 PM         Electronically signed by Casie Davey PA-C on 12/1/2017 at 1:03 PM Patient seen and examined independently by me. Above discussed and I agree with CNP. Labs, cultures, and radiographs where available were reviewed. See orders for the updated patient care plan.     Ron Green MD, Patient is now extubated but not communicative at this time lungs appeared relatively clear abdomen is soft patient does have swelling over his forehead a hyperhidrosis syndrome at this time likely secondary to head injury as her equal his white count is normal hemoglobin is stable at 13 we'll continue to monitor pulmonary status closely if remains extubated we'll consider speech eval cl12/1/2017   2:41 PM

## 2017-12-02 LAB
ANION GAP SERPL CALCULATED.3IONS-SCNC: 15 MEQ/L (ref 8–16)
BUN BLDV-MCNC: 13 MG/DL (ref 7–22)
CALCIUM SERPL-MCNC: 9 MG/DL (ref 8.5–10.5)
CHLORIDE BLD-SCNC: 104 MEQ/L (ref 98–111)
CO2: 24 MEQ/L (ref 23–33)
CREAT SERPL-MCNC: 0.3 MG/DL (ref 0.4–1.2)
GFR SERPL CREATININE-BSD FRML MDRD: > 90 ML/MIN/1.73M2
GLUCOSE BLD-MCNC: 109 MG/DL (ref 70–108)
HCT VFR BLD CALC: 41.3 % (ref 42–52)
HEMOGLOBIN: 14.2 GM/DL (ref 14–18)
MCH RBC QN AUTO: 29.5 PG (ref 27–31)
MCHC RBC AUTO-ENTMCNC: 34.4 GM/DL (ref 33–37)
MCV RBC AUTO: 85.9 FL (ref 80–94)
PDW BLD-RTO: 12.7 % (ref 11.5–14.5)
PLATELET # BLD: 212 THOU/MM3 (ref 130–400)
PMV BLD AUTO: 8.2 MCM (ref 7.4–10.4)
POTASSIUM SERPL-SCNC: 3.8 MEQ/L (ref 3.5–5.2)
RBC # BLD: 4.81 MILL/MM3 (ref 4.7–6.1)
SODIUM BLD-SCNC: 143 MEQ/L (ref 135–145)
WBC # BLD: 6 THOU/MM3 (ref 4.8–10.8)

## 2017-12-02 PROCEDURE — 6360000002 HC RX W HCPCS: Performed by: INTERNAL MEDICINE

## 2017-12-02 PROCEDURE — 2500000003 HC RX 250 WO HCPCS: Performed by: INTERNAL MEDICINE

## 2017-12-02 PROCEDURE — 6370000000 HC RX 637 (ALT 250 FOR IP): Performed by: NURSE PRACTITIONER

## 2017-12-02 PROCEDURE — 2700000000 HC OXYGEN THERAPY PER DAY

## 2017-12-02 PROCEDURE — 6370000000 HC RX 637 (ALT 250 FOR IP): Performed by: INTERNAL MEDICINE

## 2017-12-02 PROCEDURE — 2580000003 HC RX 258: Performed by: SURGERY

## 2017-12-02 PROCEDURE — 2000000000 HC ICU R&B

## 2017-12-02 PROCEDURE — 94640 AIRWAY INHALATION TREATMENT: CPT

## 2017-12-02 PROCEDURE — 36415 COLL VENOUS BLD VENIPUNCTURE: CPT

## 2017-12-02 PROCEDURE — S0028 INJECTION, FAMOTIDINE, 20 MG: HCPCS | Performed by: INTERNAL MEDICINE

## 2017-12-02 PROCEDURE — 6360000002 HC RX W HCPCS

## 2017-12-02 PROCEDURE — 2580000003 HC RX 258: Performed by: INTERNAL MEDICINE

## 2017-12-02 PROCEDURE — 94660 CPAP INITIATION&MGMT: CPT

## 2017-12-02 PROCEDURE — 99291 CRITICAL CARE FIRST HOUR: CPT | Performed by: INTERNAL MEDICINE

## 2017-12-02 PROCEDURE — 80048 BASIC METABOLIC PNL TOTAL CA: CPT

## 2017-12-02 PROCEDURE — 6360000002 HC RX W HCPCS: Performed by: SURGERY

## 2017-12-02 PROCEDURE — 85027 COMPLETE CBC AUTOMATED: CPT

## 2017-12-02 PROCEDURE — 99233 SBSQ HOSP IP/OBS HIGH 50: CPT | Performed by: SURGERY

## 2017-12-02 PROCEDURE — 6370000000 HC RX 637 (ALT 250 FOR IP): Performed by: PHYSICIAN ASSISTANT

## 2017-12-02 RX ORDER — FUROSEMIDE 10 MG/ML
40 INJECTION INTRAMUSCULAR; INTRAVENOUS ONCE
Status: COMPLETED | OUTPATIENT
Start: 2017-12-02 | End: 2017-12-02

## 2017-12-02 RX ORDER — FUROSEMIDE 10 MG/ML
INJECTION INTRAMUSCULAR; INTRAVENOUS
Status: COMPLETED
Start: 2017-12-02 | End: 2017-12-02

## 2017-12-02 RX ADMIN — DEXMEDETOMIDINE HYDROCHLORIDE 1.7 MCG/KG/HR: 100 INJECTION, SOLUTION INTRAVENOUS at 07:21

## 2017-12-02 RX ADMIN — DEXMEDETOMIDINE HYDROCHLORIDE 1.7 MCG/KG/HR: 100 INJECTION, SOLUTION INTRAVENOUS at 05:46

## 2017-12-02 RX ADMIN — DEXMEDETOMIDINE HYDROCHLORIDE 1.4 MCG/KG/HR: 100 INJECTION, SOLUTION INTRAVENOUS at 01:39

## 2017-12-02 RX ADMIN — IPRATROPIUM BROMIDE AND ALBUTEROL SULFATE 1 AMPULE: .5; 3 SOLUTION RESPIRATORY (INHALATION) at 12:27

## 2017-12-02 RX ADMIN — FAMOTIDINE 20 MG: 10 INJECTION, SOLUTION INTRAVENOUS at 20:45

## 2017-12-02 RX ADMIN — Medication 10 ML: at 20:46

## 2017-12-02 RX ADMIN — FAMOTIDINE 20 MG: 10 INJECTION, SOLUTION INTRAVENOUS at 08:41

## 2017-12-02 RX ADMIN — IPRATROPIUM BROMIDE AND ALBUTEROL SULFATE 1 AMPULE: .5; 3 SOLUTION RESPIRATORY (INHALATION) at 08:05

## 2017-12-02 RX ADMIN — DEXMEDETOMIDINE HYDROCHLORIDE 1.3 MCG/KG/HR: 100 INJECTION, SOLUTION INTRAVENOUS at 21:51

## 2017-12-02 RX ADMIN — HYDRALAZINE HYDROCHLORIDE 20 MG: 20 INJECTION INTRAMUSCULAR; INTRAVENOUS at 03:11

## 2017-12-02 RX ADMIN — DEXMEDETOMIDINE HYDROCHLORIDE 1.2 MCG/KG/HR: 100 INJECTION, SOLUTION INTRAVENOUS at 16:32

## 2017-12-02 RX ADMIN — Medication 10 ML: at 08:43

## 2017-12-02 RX ADMIN — FUROSEMIDE 40 MG: 10 INJECTION INTRAMUSCULAR; INTRAVENOUS at 15:10

## 2017-12-02 RX ADMIN — DEXMEDETOMIDINE HYDROCHLORIDE 1.2 MCG/KG/HR: 100 INJECTION, SOLUTION INTRAVENOUS at 19:06

## 2017-12-02 RX ADMIN — DEXMEDETOMIDINE HYDROCHLORIDE 1.6 MCG/KG/HR: 100 INJECTION, SOLUTION INTRAVENOUS at 03:46

## 2017-12-02 RX ADMIN — IPRATROPIUM BROMIDE AND ALBUTEROL SULFATE 1 AMPULE: .5; 3 SOLUTION RESPIRATORY (INHALATION) at 21:16

## 2017-12-02 RX ADMIN — AMPICILLIN SODIUM AND SULBACTAM SODIUM 3 G: 2; 1 INJECTION, POWDER, FOR SOLUTION INTRAMUSCULAR; INTRAVENOUS at 05:28

## 2017-12-02 RX ADMIN — IPRATROPIUM BROMIDE AND ALBUTEROL SULFATE 1 AMPULE: .5; 3 SOLUTION RESPIRATORY (INHALATION) at 04:53

## 2017-12-02 RX ADMIN — IPRATROPIUM BROMIDE AND ALBUTEROL SULFATE 1 AMPULE: .5; 3 SOLUTION RESPIRATORY (INHALATION) at 00:30

## 2017-12-02 RX ADMIN — AMPICILLIN SODIUM AND SULBACTAM SODIUM 3 G: 2; 1 INJECTION, POWDER, FOR SOLUTION INTRAMUSCULAR; INTRAVENOUS at 23:09

## 2017-12-02 RX ADMIN — HALOPERIDOL LACTATE 5 MG: 5 INJECTION, SOLUTION INTRAMUSCULAR at 03:11

## 2017-12-02 RX ADMIN — HALOPERIDOL LACTATE 5 MG: 5 INJECTION, SOLUTION INTRAMUSCULAR at 21:02

## 2017-12-02 RX ADMIN — BACITRACIN ZINC 1 G: 500 OINTMENT TOPICAL at 20:41

## 2017-12-02 RX ADMIN — ENOXAPARIN SODIUM 40 MG: 40 INJECTION SUBCUTANEOUS at 08:50

## 2017-12-02 RX ADMIN — FUROSEMIDE 40 MG: 10 INJECTION, SOLUTION INTRAMUSCULAR; INTRAVENOUS at 15:10

## 2017-12-02 RX ADMIN — AMPICILLIN SODIUM AND SULBACTAM SODIUM 3 G: 2; 1 INJECTION, POWDER, FOR SOLUTION INTRAMUSCULAR; INTRAVENOUS at 10:19

## 2017-12-02 RX ADMIN — SODIUM CHLORIDE: 9 INJECTION, SOLUTION INTRAVENOUS at 23:09

## 2017-12-02 RX ADMIN — BACITRACIN ZINC 1 G: 500 OINTMENT TOPICAL at 08:45

## 2017-12-02 RX ADMIN — IPRATROPIUM BROMIDE AND ALBUTEROL SULFATE 1 AMPULE: .5; 3 SOLUTION RESPIRATORY (INHALATION) at 16:05

## 2017-12-02 RX ADMIN — SODIUM CHLORIDE: 9 INJECTION, SOLUTION INTRAVENOUS at 09:29

## 2017-12-02 RX ADMIN — DEXMEDETOMIDINE HYDROCHLORIDE 1.4 MCG/KG/HR: 100 INJECTION, SOLUTION INTRAVENOUS at 11:44

## 2017-12-02 RX ADMIN — DEXMEDETOMIDINE HYDROCHLORIDE 1.7 MCG/KG/HR: 100 INJECTION, SOLUTION INTRAVENOUS at 09:28

## 2017-12-02 RX ADMIN — AMPICILLIN SODIUM AND SULBACTAM SODIUM 3 G: 2; 1 INJECTION, POWDER, FOR SOLUTION INTRAMUSCULAR; INTRAVENOUS at 17:25

## 2017-12-02 ASSESSMENT — PAIN SCALES - GENERAL
PAINLEVEL_OUTOF10: 0
PAINLEVEL_OUTOF10: 0

## 2017-12-02 NOTE — PLAN OF CARE
Problem: Impaired respiratory status  Goal: Able to breathe comfortably  Able to breathe comfortably     Outcome: Ongoing  Pt remains on bipap, ffm at this time with gecko on, performa mask in room to alternate with,  duoneb q4 to improve aeration  fio2 at 30% via bipap

## 2017-12-02 NOTE — PLAN OF CARE
Problem: MECHANICAL VENTILATION  Goal: Patient will achieve/maintain normal respiratory rate/effort  Outcome: Completed Date Met: 12/01/17

## 2017-12-02 NOTE — PLAN OF CARE
Problem: Risk for Impaired Skin Integrity  Goal: Tissue integrity - skin and mucous membranes  Structural intactness and normal physiological function of skin and  mucous membranes. Outcome: Ongoing  No evidence of new skin breakdown noted at this time. Areas of compromised skin integrity r/t trauma in various stages of healing. Patient turned Q2H and as needed. Problem: Discharge Planning:  Goal: Participates in care planning  Participates in care planning   Outcome: Not Met This Shift  Patient is not alert or oriented at this time and is unable to participate in care planning. Family involved in care planning in place of patient. Goal: Discharged to appropriate level of care  Discharged to appropriate level of care   Outcome: Not Met This Shift  Patient remains in the ICU at this time. Discharge planning continues. Problem: Airway Clearance - Ineffective:  Goal: Ability to maintain a clear airway will improve  Ability to maintain a clear airway will improve   Outcome: Ongoing  Patient's airway remains clear with Bipap support. Oxygen saturation remains above 90% at this time. Problem: Anxiety/Stress:  Goal: Level of anxiety will decrease  Level of anxiety will decrease   Outcome: Ongoing  Patient continues to have intermittent anxiety and restlessness. Haldol given per order. Precedex drip continues. Problem: Aspiration:  Goal: Absence of aspiration  Absence of aspiration   Outcome: Ongoing  No evidence of aspiration at this time. HOB remains above 30 degrees. Lungs clear/diminished throughout. Problem: Mental Status - Impaired:  Goal: Mental status will be restored to baseline  Mental status will be restored to baseline   Outcome: Not Met This Shift  Patient does not follow commands at this time - arouses to voice. Intermittent anxiety/agitation continues.     Problem: Nutrition Deficit:  Goal: Ability to achieve adequate nutritional intake will improve  Ability to achieve adequate nutritional intake will improve   Outcome: Not Met This Shift  Patient remains NPO at this time. Problem: Pain:  Goal: Pain level will decrease  Pain level will decrease   Outcome: Ongoing  No pain identified per CPOT scale at this time - will continue to monitor. Goal: Recognizes and communicates pain  Recognizes and communicates pain   Outcome: Not Met This Shift  Patient unable to communicate with staff at this time. Goal: Control of acute pain  Control of acute pain   Outcome: Ongoing  No pain identified per CPOT scale at this time. Problem: Skin Integrity - Impaired:  Goal: Will show no infection signs and symptoms  Will show no infection signs and symptoms   Outcome: Ongoing    Goal: Absence of new skin breakdown  Absence of new skin breakdown   Outcome: Ongoing  No evidence of new skin breakdown at this time. Continue to turn patient Q2H and PRN. Problem: Restraint Use - Nonviolent/Non-Self-Destructive Behavior:  Goal: Absence of restraint indications  Absence of restraint indications    Outcome: Not Met This Shift  Patient continues to pull at medical devices. Absence of restraint indications not met at this time. Goal: Absence of restraint-related injury  Absence of restraint-related injury   Outcome: Ongoing  No evidence of restraint related injury at this time. Will continue to monitor. Problem: Nutrition  Goal: Optimal nutrition therapy  Outcome: Not Met This Shift  Patient remains NPO at this time. Problem: Falls - Risk of  Intervention: Fall risk assessment  Care plan reviewed with patient's mother. Patient's mother verbalizes understanding of the plan of care and contributes to goal setting. Goal: Absence of falls  Outcome: Ongoing  Fall prevention measures in place. Patient remains on bedrest - no falls at this time.

## 2017-12-02 NOTE — PLAN OF CARE
Problem: Impaired respiratory status  Goal: Able to breathe comfortably  Able to breathe comfortably    Outcome: Ongoing  Pt on bipap of 12/6.  Tolerating settings well

## 2017-12-02 NOTE — PROGRESS NOTES
Date of service: 12/2/17  Time of service: 1510 hrs. The patient is following commands today. He could follow one step commands but not two-step commands. No unequal paresis. No otorrhea. No rhinorrhea. I called his mother and updated her. Assessment: diffuse axonal injury. Plan: he will need rehab. Disposition per primary service. I will follow as needed.
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
## 2017-12-02 NOTE — FLOWSHEET NOTE
12/02/17 1417   Fall Risk Interventions   Hourly Visual Checks Awake; In bed  (P.T. in to see patient)   Patient now with eyes open but restless. Is following some commands.
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
## 2017-12-02 NOTE — PLAN OF CARE
Problem: Risk for Impaired Skin Integrity  Goal: Tissue integrity - skin and mucous membranes  Structural intactness and normal physiological function of skin and  mucous membranes. Outcome: Ongoing  Patient with multiple lacerations, but is moving around in the bed on his own and has no other open areas. Problem: Discharge Planning:  Goal: Participates in care planning  Participates in care planning   Outcome: Ongoing  Patient will require some rehab before returning home with family. Goal: Discharged to appropriate level of care  Discharged to appropriate level of care   Outcome: Ongoing      Problem: Airway Clearance - Ineffective:  Goal: Ability to maintain a clear airway will improve  Ability to maintain a clear airway will improve   Outcome: Met This Shift  Patient off bipap and maintaining good pulse ox on room air. Problem: Anxiety/Stress:  Goal: Level of anxiety will decrease  Level of anxiety will decrease   Outcome: Ongoing  Patient being weaned down on precedex. Had some periods of restlessness, but finally was awake with his eyes opened for and extended period and able to swallow sips of water from a spoon without difficulty. Problem: Aspiration:  Goal: Absence of aspiration  Absence of aspiration   Outcome: Met This Shift      Problem: Mental Status - Impaired:  Goal: Mental status will be restored to baseline  Mental status will be restored to baseline   Outcome: Ongoing  Patient having increased amount of alertness as precedex is being weaned off. Problem: Nutrition Deficit:  Goal: Ability to achieve adequate nutritional intake will improve  Ability to achieve adequate nutritional intake will improve   Outcome: Not Met This Shift  Patient not fully awake for eating. Speech therapist states that she will be back tomorrow to do a swallowing eval if patient is more alert.     Problem: Pain:  Goal: Pain level will decrease  Pain level will decrease   Outcome: Ongoing  Patient unable to communicate his pain verbally. Mother states that he has been so much more relaxed today. Goal: Recognizes and communicates pain  Recognizes and communicates pain   Outcome: Not Met This Shift    Goal: Control of acute pain  Control of acute pain   Outcome: Ongoing      Problem: Skin Integrity - Impaired:  Goal: Will show no infection signs and symptoms  Will show no infection signs and symptoms   Outcome: Ongoing  Patient with normal WBC, but has been running low grade fever. Goal: Absence of new skin breakdown  Absence of new skin breakdown   Outcome: Met This Shift      Problem: Restraint Use - Nonviolent/Non-Self-Destructive Behavior:  Goal: Absence of restraint indications  Absence of restraint indications    Outcome: Met This Shift  Restraints removed when patient was taken off bipap. Goal: Absence of restraint-related injury  Absence of restraint-related injury   Outcome: Met This Shift  Wrists without any redness or open areas. Problem: Nutrition  Goal: Optimal nutrition therapy  Outcome: Not Met This Shift  Patient remains NPO. Problem: Falls - Risk of  Goal: Absence of falls  Outcome: Met This Shift  Patient has remained in bed, and has not tried getting out of bed. Comments: Patient's Mom and girlfriend have been participating in plan of care and goal setting.

## 2017-12-03 ENCOUNTER — APPOINTMENT (OUTPATIENT)
Dept: CT IMAGING | Age: 32
DRG: 082 | End: 2017-12-03
Payer: COMMERCIAL

## 2017-12-03 LAB
ALBUMIN SERPL-MCNC: 3.2 G/DL (ref 3.5–5.1)
ALP BLD-CCNC: 63 U/L (ref 38–126)
ALT SERPL-CCNC: 41 U/L (ref 11–66)
ANION GAP SERPL CALCULATED.3IONS-SCNC: 15 MEQ/L (ref 8–16)
AST SERPL-CCNC: 37 U/L (ref 5–40)
BASOPHILS # BLD: 0.3 %
BASOPHILS ABSOLUTE: 0 THOU/MM3 (ref 0–0.1)
BILIRUB SERPL-MCNC: 2.2 MG/DL (ref 0.3–1.2)
BUN BLDV-MCNC: 14 MG/DL (ref 7–22)
CALCIUM SERPL-MCNC: 8.8 MG/DL (ref 8.5–10.5)
CHLORIDE BLD-SCNC: 104 MEQ/L (ref 98–111)
CO2: 22 MEQ/L (ref 23–33)
CREAT SERPL-MCNC: 0.4 MG/DL (ref 0.4–1.2)
EOSINOPHIL # BLD: 2.6 %
EOSINOPHILS ABSOLUTE: 0.2 THOU/MM3 (ref 0–0.4)
GFR SERPL CREATININE-BSD FRML MDRD: > 90 ML/MIN/1.73M2
GLUCOSE BLD-MCNC: 105 MG/DL (ref 70–108)
HCT VFR BLD CALC: 42.1 % (ref 42–52)
HEMOGLOBIN: 14.8 GM/DL (ref 14–18)
LYMPHOCYTES # BLD: 10.5 %
LYMPHOCYTES ABSOLUTE: 0.8 THOU/MM3 (ref 1–4.8)
MAGNESIUM: 1.8 MG/DL (ref 1.6–2.4)
MCH RBC QN AUTO: 29.9 PG (ref 27–31)
MCHC RBC AUTO-ENTMCNC: 35.1 GM/DL (ref 33–37)
MCV RBC AUTO: 85.2 FL (ref 80–94)
MONOCYTES # BLD: 10.5 %
MONOCYTES ABSOLUTE: 0.8 THOU/MM3 (ref 0.4–1.3)
NUCLEATED RED BLOOD CELLS: 0 /100 WBC
PDW BLD-RTO: 12.2 % (ref 11.5–14.5)
PHOSPHORUS: 3.8 MG/DL (ref 2.4–4.7)
PLATELET # BLD: 231 THOU/MM3 (ref 130–400)
PMV BLD AUTO: 8.4 MCM (ref 7.4–10.4)
POTASSIUM SERPL-SCNC: 3.8 MEQ/L (ref 3.5–5.2)
RBC # BLD: 4.94 MILL/MM3 (ref 4.7–6.1)
SEG NEUTROPHILS: 76.1 %
SEGMENTED NEUTROPHILS ABSOLUTE COUNT: 5.8 THOU/MM3 (ref 1.8–7.7)
SODIUM BLD-SCNC: 141 MEQ/L (ref 135–145)
TOTAL PROTEIN: 6.2 G/DL (ref 6.1–8)
WBC # BLD: 7.6 THOU/MM3 (ref 4.8–10.8)

## 2017-12-03 PROCEDURE — S0028 INJECTION, FAMOTIDINE, 20 MG: HCPCS | Performed by: INTERNAL MEDICINE

## 2017-12-03 PROCEDURE — 94640 AIRWAY INHALATION TREATMENT: CPT

## 2017-12-03 PROCEDURE — 2500000003 HC RX 250 WO HCPCS: Performed by: INTERNAL MEDICINE

## 2017-12-03 PROCEDURE — 85025 COMPLETE CBC W/AUTO DIFF WBC: CPT

## 2017-12-03 PROCEDURE — 6360000002 HC RX W HCPCS: Performed by: SURGERY

## 2017-12-03 PROCEDURE — 83735 ASSAY OF MAGNESIUM: CPT

## 2017-12-03 PROCEDURE — G8988 SELF CARE GOAL STATUS: HCPCS

## 2017-12-03 PROCEDURE — 97110 THERAPEUTIC EXERCISES: CPT

## 2017-12-03 PROCEDURE — 71275 CT ANGIOGRAPHY CHEST: CPT

## 2017-12-03 PROCEDURE — A6446 CONFORM BAND S W>=3" <5"/YD: HCPCS

## 2017-12-03 PROCEDURE — 6360000002 HC RX W HCPCS: Performed by: INTERNAL MEDICINE

## 2017-12-03 PROCEDURE — 2060000000 HC ICU INTERMEDIATE R&B

## 2017-12-03 PROCEDURE — B32T1ZZ COMPUTERIZED TOMOGRAPHY (CT SCAN) OF LEFT PULMONARY ARTERY USING LOW OSMOLAR CONTRAST: ICD-10-PCS | Performed by: RADIOLOGY

## 2017-12-03 PROCEDURE — 2580000003 HC RX 258: Performed by: INTERNAL MEDICINE

## 2017-12-03 PROCEDURE — 97163 PT EVAL HIGH COMPLEX 45 MIN: CPT

## 2017-12-03 PROCEDURE — 84100 ASSAY OF PHOSPHORUS: CPT

## 2017-12-03 PROCEDURE — 99291 CRITICAL CARE FIRST HOUR: CPT | Performed by: INTERNAL MEDICINE

## 2017-12-03 PROCEDURE — 2580000003 HC RX 258: Performed by: SURGERY

## 2017-12-03 PROCEDURE — 97165 OT EVAL LOW COMPLEX 30 MIN: CPT

## 2017-12-03 PROCEDURE — 97166 OT EVAL MOD COMPLEX 45 MIN: CPT

## 2017-12-03 PROCEDURE — 97530 THERAPEUTIC ACTIVITIES: CPT

## 2017-12-03 PROCEDURE — G8979 MOBILITY GOAL STATUS: HCPCS

## 2017-12-03 PROCEDURE — 80053 COMPREHEN METABOLIC PANEL: CPT

## 2017-12-03 PROCEDURE — 6370000000 HC RX 637 (ALT 250 FOR IP): Performed by: INTERNAL MEDICINE

## 2017-12-03 PROCEDURE — 6370000000 HC RX 637 (ALT 250 FOR IP): Performed by: NURSE PRACTITIONER

## 2017-12-03 PROCEDURE — B3201ZZ COMPUTERIZED TOMOGRAPHY (CT SCAN) OF THORACIC AORTA USING LOW OSMOLAR CONTRAST: ICD-10-PCS | Performed by: RADIOLOGY

## 2017-12-03 PROCEDURE — 6370000000 HC RX 637 (ALT 250 FOR IP): Performed by: PHYSICIAN ASSISTANT

## 2017-12-03 PROCEDURE — 36415 COLL VENOUS BLD VENIPUNCTURE: CPT

## 2017-12-03 PROCEDURE — B32S1ZZ COMPUTERIZED TOMOGRAPHY (CT SCAN) OF RIGHT PULMONARY ARTERY USING LOW OSMOLAR CONTRAST: ICD-10-PCS | Performed by: RADIOLOGY

## 2017-12-03 PROCEDURE — G8987 SELF CARE CURRENT STATUS: HCPCS

## 2017-12-03 PROCEDURE — G8978 MOBILITY CURRENT STATUS: HCPCS

## 2017-12-03 PROCEDURE — 99233 SBSQ HOSP IP/OBS HIGH 50: CPT | Performed by: SURGERY

## 2017-12-03 RX ORDER — IPRATROPIUM BROMIDE AND ALBUTEROL SULFATE 2.5; .5 MG/3ML; MG/3ML
1 SOLUTION RESPIRATORY (INHALATION) EVERY 4 HOURS PRN
Status: DISCONTINUED | OUTPATIENT
Start: 2017-12-03 | End: 2017-12-08 | Stop reason: HOSPADM

## 2017-12-03 RX ADMIN — DEXMEDETOMIDINE HYDROCHLORIDE 1.5 MCG/KG/HR: 100 INJECTION, SOLUTION INTRAVENOUS at 00:07

## 2017-12-03 RX ADMIN — AMPICILLIN SODIUM AND SULBACTAM SODIUM 3 G: 2; 1 INJECTION, POWDER, FOR SOLUTION INTRAMUSCULAR; INTRAVENOUS at 10:34

## 2017-12-03 RX ADMIN — BACITRACIN ZINC 1 G: 500 OINTMENT TOPICAL at 10:38

## 2017-12-03 RX ADMIN — ENOXAPARIN SODIUM 40 MG: 40 INJECTION SUBCUTANEOUS at 08:40

## 2017-12-03 RX ADMIN — SODIUM CHLORIDE: 9 INJECTION, SOLUTION INTRAVENOUS at 18:26

## 2017-12-03 RX ADMIN — IPRATROPIUM BROMIDE AND ALBUTEROL SULFATE 1 AMPULE: .5; 3 SOLUTION RESPIRATORY (INHALATION) at 08:29

## 2017-12-03 RX ADMIN — AMPICILLIN SODIUM AND SULBACTAM SODIUM 3 G: 2; 1 INJECTION, POWDER, FOR SOLUTION INTRAMUSCULAR; INTRAVENOUS at 04:15

## 2017-12-03 RX ADMIN — IPRATROPIUM BROMIDE AND ALBUTEROL SULFATE 1 AMPULE: .5; 3 SOLUTION RESPIRATORY (INHALATION) at 00:56

## 2017-12-03 RX ADMIN — DEXMEDETOMIDINE HYDROCHLORIDE 1.5 MCG/KG/HR: 100 INJECTION, SOLUTION INTRAVENOUS at 06:45

## 2017-12-03 RX ADMIN — Medication 10 ML: at 08:32

## 2017-12-03 RX ADMIN — HALOPERIDOL LACTATE 5 MG: 5 INJECTION, SOLUTION INTRAMUSCULAR at 10:34

## 2017-12-03 RX ADMIN — ONDANSETRON 4 MG: 2 INJECTION INTRAMUSCULAR; INTRAVENOUS at 20:17

## 2017-12-03 RX ADMIN — BACITRACIN ZINC 1 G: 500 OINTMENT TOPICAL at 19:49

## 2017-12-03 RX ADMIN — HALOPERIDOL LACTATE 5 MG: 5 INJECTION, SOLUTION INTRAMUSCULAR at 03:26

## 2017-12-03 RX ADMIN — DEXMEDETOMIDINE HYDROCHLORIDE 0.4 MCG/KG/HR: 100 INJECTION, SOLUTION INTRAVENOUS at 21:20

## 2017-12-03 RX ADMIN — FAMOTIDINE 20 MG: 10 INJECTION, SOLUTION INTRAVENOUS at 21:34

## 2017-12-03 RX ADMIN — IPRATROPIUM BROMIDE AND ALBUTEROL SULFATE 1 AMPULE: .5; 3 SOLUTION RESPIRATORY (INHALATION) at 05:12

## 2017-12-03 RX ADMIN — AMPICILLIN SODIUM AND SULBACTAM SODIUM 3 G: 2; 1 INJECTION, POWDER, FOR SOLUTION INTRAMUSCULAR; INTRAVENOUS at 17:29

## 2017-12-03 RX ADMIN — FAMOTIDINE 20 MG: 10 INJECTION, SOLUTION INTRAVENOUS at 08:40

## 2017-12-03 RX ADMIN — AMPICILLIN SODIUM AND SULBACTAM SODIUM 3 G: 2; 1 INJECTION, POWDER, FOR SOLUTION INTRAMUSCULAR; INTRAVENOUS at 21:33

## 2017-12-03 RX ADMIN — DEXMEDETOMIDINE HYDROCHLORIDE 0.9 MCG/KG/HR: 100 INJECTION, SOLUTION INTRAVENOUS at 09:38

## 2017-12-03 RX ADMIN — DEXMEDETOMIDINE HYDROCHLORIDE 1.29 MCG/KG/HR: 100 INJECTION, SOLUTION INTRAVENOUS at 04:28

## 2017-12-03 RX ADMIN — HALOPERIDOL LACTATE 5 MG: 5 INJECTION, SOLUTION INTRAMUSCULAR at 17:30

## 2017-12-03 ASSESSMENT — PAIN SCALES - GENERAL
PAINLEVEL_OUTOF10: 0
PAINLEVEL_OUTOF10: 0
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
## 2017-12-03 NOTE — PROGRESS NOTES
See flowsheet for complete assessment details. With pt's mother at bedside, pt was able to answer questions with one word responses in soft, slightly garble voice. Pt squeezed fingers when asked, showed \"thumbs up\" with both hands, wiggled toes and lifted legs off bed. Goal is to try haldol instead of precedex increases to control agitation.

## 2017-12-03 NOTE — PROGRESS NOTES
Pt calmer, rass 0. Precedex currently at 1.5 mcg.kg/min. Will attempt to wean as able. No respiratory distress noted. HR 60. Will continue to closely monitor.

## 2017-12-04 ENCOUNTER — APPOINTMENT (OUTPATIENT)
Dept: INTERVENTIONAL RADIOLOGY/VASCULAR | Age: 32
DRG: 082 | End: 2017-12-04
Payer: COMMERCIAL

## 2017-12-04 LAB
ALLEN TEST: POSITIVE
ANION GAP SERPL CALCULATED.3IONS-SCNC: 17 MEQ/L (ref 8–16)
BASE EXCESS (CALCULATED): -1.4 MMOL/L (ref -2.5–2.5)
BASOPHILS # BLD: 0.5 %
BASOPHILS ABSOLUTE: 0 THOU/MM3 (ref 0–0.1)
BUN BLDV-MCNC: 15 MG/DL (ref 7–22)
CALCIUM SERPL-MCNC: 9.1 MG/DL (ref 8.5–10.5)
CHLORIDE BLD-SCNC: 103 MEQ/L (ref 98–111)
CO2: 22 MEQ/L (ref 23–33)
COLLECTED BY:: ABNORMAL
CREAT SERPL-MCNC: 0.4 MG/DL (ref 0.4–1.2)
DEVICE: ABNORMAL
EOSINOPHIL # BLD: 3.9 %
EOSINOPHILS ABSOLUTE: 0.3 THOU/MM3 (ref 0–0.4)
GFR SERPL CREATININE-BSD FRML MDRD: > 90 ML/MIN/1.73M2
GLUCOSE BLD-MCNC: 95 MG/DL (ref 70–108)
HCO3: 22 MMOL/L (ref 23–28)
HCT VFR BLD CALC: 47.6 % (ref 42–52)
HEMOGLOBIN: 16.3 GM/DL (ref 14–18)
IFIO2: 2
LYMPHOCYTES # BLD: 15.8 %
LYMPHOCYTES ABSOLUTE: 1 THOU/MM3 (ref 1–4.8)
MAGNESIUM: 1.9 MG/DL (ref 1.6–2.4)
MCH RBC QN AUTO: 29.4 PG (ref 27–31)
MCHC RBC AUTO-ENTMCNC: 34.4 GM/DL (ref 33–37)
MCV RBC AUTO: 85.7 FL (ref 80–94)
MONOCYTES # BLD: 14.2 %
MONOCYTES ABSOLUTE: 0.9 THOU/MM3 (ref 0.4–1.3)
NUCLEATED RED BLOOD CELLS: 0 /100 WBC
O2 SATURATION: 97 %
PCO2: 31 MMHG (ref 35–45)
PDW BLD-RTO: 12.4 % (ref 11.5–14.5)
PH BLOOD GAS: 7.45 (ref 7.35–7.45)
PHOSPHORUS: 4.7 MG/DL (ref 2.4–4.7)
PLATELET # BLD: 272 THOU/MM3 (ref 130–400)
PMV BLD AUTO: 8.2 MCM (ref 7.4–10.4)
PO2: 81 MMHG (ref 71–104)
POTASSIUM SERPL-SCNC: 3.7 MEQ/L (ref 3.5–5.2)
RBC # BLD: 5.55 MILL/MM3 (ref 4.7–6.1)
SEG NEUTROPHILS: 65.6 %
SEGMENTED NEUTROPHILS ABSOLUTE COUNT: 4.3 THOU/MM3 (ref 1.8–7.7)
SODIUM BLD-SCNC: 142 MEQ/L (ref 135–145)
SOURCE, BLOOD GAS: ABNORMAL
WBC # BLD: 6.6 THOU/MM3 (ref 4.8–10.8)

## 2017-12-04 PROCEDURE — 2580000003 HC RX 258: Performed by: INTERNAL MEDICINE

## 2017-12-04 PROCEDURE — 97530 THERAPEUTIC ACTIVITIES: CPT

## 2017-12-04 PROCEDURE — 6360000002 HC RX W HCPCS: Performed by: SURGERY

## 2017-12-04 PROCEDURE — 6370000000 HC RX 637 (ALT 250 FOR IP): Performed by: NURSE PRACTITIONER

## 2017-12-04 PROCEDURE — 6360000002 HC RX W HCPCS: Performed by: INTERNAL MEDICINE

## 2017-12-04 PROCEDURE — 2700000000 HC OXYGEN THERAPY PER DAY

## 2017-12-04 PROCEDURE — 6360000004 HC RX CONTRAST MEDICATION: Performed by: INTERNAL MEDICINE

## 2017-12-04 PROCEDURE — 2060000000 HC ICU INTERMEDIATE R&B

## 2017-12-04 PROCEDURE — 6370000000 HC RX 637 (ALT 250 FOR IP): Performed by: INTERNAL MEDICINE

## 2017-12-04 PROCEDURE — S0028 INJECTION, FAMOTIDINE, 20 MG: HCPCS | Performed by: INTERNAL MEDICINE

## 2017-12-04 PROCEDURE — 2500000003 HC RX 250 WO HCPCS: Performed by: INTERNAL MEDICINE

## 2017-12-04 PROCEDURE — 97110 THERAPEUTIC EXERCISES: CPT

## 2017-12-04 PROCEDURE — 99999 PR OFFICE/OUTPT VISIT,PROCEDURE ONLY: CPT | Performed by: NURSE PRACTITIONER

## 2017-12-04 PROCEDURE — 99291 CRITICAL CARE FIRST HOUR: CPT | Performed by: INTERNAL MEDICINE

## 2017-12-04 PROCEDURE — 82803 BLOOD GASES ANY COMBINATION: CPT

## 2017-12-04 PROCEDURE — 93000 ELECTROCARDIOGRAM COMPLETE: CPT | Performed by: INTERNAL MEDICINE

## 2017-12-04 PROCEDURE — 93970 EXTREMITY STUDY: CPT

## 2017-12-04 PROCEDURE — 83735 ASSAY OF MAGNESIUM: CPT

## 2017-12-04 PROCEDURE — 36415 COLL VENOUS BLD VENIPUNCTURE: CPT

## 2017-12-04 PROCEDURE — 93005 ELECTROCARDIOGRAM TRACING: CPT

## 2017-12-04 PROCEDURE — 92610 EVALUATE SWALLOWING FUNCTION: CPT

## 2017-12-04 PROCEDURE — 36600 WITHDRAWAL OF ARTERIAL BLOOD: CPT

## 2017-12-04 PROCEDURE — 84100 ASSAY OF PHOSPHORUS: CPT

## 2017-12-04 PROCEDURE — 2580000003 HC RX 258: Performed by: SURGERY

## 2017-12-04 PROCEDURE — 80048 BASIC METABOLIC PNL TOTAL CA: CPT

## 2017-12-04 PROCEDURE — 85025 COMPLETE CBC W/AUTO DIFF WBC: CPT

## 2017-12-04 RX ORDER — DOCUSATE SODIUM 100 MG/1
100 CAPSULE, LIQUID FILLED ORAL 2 TIMES DAILY
Status: DISCONTINUED | OUTPATIENT
Start: 2017-12-04 | End: 2017-12-08 | Stop reason: HOSPADM

## 2017-12-04 RX ORDER — SENNA PLUS 8.6 MG/1
2 TABLET ORAL NIGHTLY
Status: DISCONTINUED | OUTPATIENT
Start: 2017-12-04 | End: 2017-12-08 | Stop reason: HOSPADM

## 2017-12-04 RX ORDER — FAMOTIDINE 20 MG/1
20 TABLET, FILM COATED ORAL 2 TIMES DAILY
Status: DISCONTINUED | OUTPATIENT
Start: 2017-12-04 | End: 2017-12-08 | Stop reason: HOSPADM

## 2017-12-04 RX ORDER — FENTANYL CITRATE 50 UG/ML
25 INJECTION, SOLUTION INTRAMUSCULAR; INTRAVENOUS EVERY 4 HOURS PRN
Status: DISCONTINUED | OUTPATIENT
Start: 2017-12-04 | End: 2017-12-04 | Stop reason: CLARIF

## 2017-12-04 RX ORDER — POLYETHYLENE GLYCOL 3350 17 G/17G
17 POWDER, FOR SOLUTION ORAL DAILY
Status: DISCONTINUED | OUTPATIENT
Start: 2017-12-04 | End: 2017-12-08 | Stop reason: HOSPADM

## 2017-12-04 RX ADMIN — ENOXAPARIN SODIUM 120 MG: 120 INJECTION SUBCUTANEOUS at 21:05

## 2017-12-04 RX ADMIN — AMPICILLIN SODIUM AND SULBACTAM SODIUM 3 G: 2; 1 INJECTION, POWDER, FOR SOLUTION INTRAMUSCULAR; INTRAVENOUS at 09:56

## 2017-12-04 RX ADMIN — Medication 10 ML: at 08:38

## 2017-12-04 RX ADMIN — HYDROMORPHONE HYDROCHLORIDE 0.25 MG: 1 INJECTION, SOLUTION INTRAMUSCULAR; INTRAVENOUS; SUBCUTANEOUS at 11:09

## 2017-12-04 RX ADMIN — ENOXAPARIN SODIUM 40 MG: 40 INJECTION SUBCUTANEOUS at 08:36

## 2017-12-04 RX ADMIN — HYDROMORPHONE HYDROCHLORIDE 0.25 MG: 1 INJECTION, SOLUTION INTRAMUSCULAR; INTRAVENOUS; SUBCUTANEOUS at 01:48

## 2017-12-04 RX ADMIN — METOPROLOL TARTRATE 25 MG: 25 TABLET ORAL at 12:36

## 2017-12-04 RX ADMIN — DEXMEDETOMIDINE HYDROCHLORIDE 0.5 MCG/KG/HR: 100 INJECTION, SOLUTION INTRAVENOUS at 04:12

## 2017-12-04 RX ADMIN — SODIUM CHLORIDE: 9 INJECTION, SOLUTION INTRAVENOUS at 09:56

## 2017-12-04 RX ADMIN — ENOXAPARIN SODIUM 80 MG: 80 INJECTION SUBCUTANEOUS at 12:36

## 2017-12-04 RX ADMIN — FAMOTIDINE 20 MG: 20 TABLET, FILM COATED ORAL at 21:07

## 2017-12-04 RX ADMIN — BACITRACIN ZINC 1 G: 500 OINTMENT TOPICAL at 21:05

## 2017-12-04 RX ADMIN — METOPROLOL TARTRATE 25 MG: 25 TABLET ORAL at 21:06

## 2017-12-04 RX ADMIN — HYDROMORPHONE HYDROCHLORIDE 0.25 MG: 1 INJECTION, SOLUTION INTRAMUSCULAR; INTRAVENOUS; SUBCUTANEOUS at 16:09

## 2017-12-04 RX ADMIN — FAMOTIDINE 20 MG: 10 INJECTION, SOLUTION INTRAVENOUS at 08:36

## 2017-12-04 RX ADMIN — IOPAMIDOL 80 ML: 755 INJECTION, SOLUTION INTRAVENOUS at 00:03

## 2017-12-04 RX ADMIN — HYDROMORPHONE HYDROCHLORIDE 0.25 MG: 1 INJECTION, SOLUTION INTRAMUSCULAR; INTRAVENOUS; SUBCUTANEOUS at 05:58

## 2017-12-04 RX ADMIN — Medication 10 ML: at 21:08

## 2017-12-04 RX ADMIN — HYDROMORPHONE HYDROCHLORIDE 0.25 MG: 1 INJECTION, SOLUTION INTRAMUSCULAR; INTRAVENOUS; SUBCUTANEOUS at 22:32

## 2017-12-04 RX ADMIN — AMPICILLIN SODIUM AND SULBACTAM SODIUM 3 G: 2; 1 INJECTION, POWDER, FOR SOLUTION INTRAMUSCULAR; INTRAVENOUS at 04:12

## 2017-12-04 RX ADMIN — BACITRACIN ZINC 1 G: 500 OINTMENT TOPICAL at 08:36

## 2017-12-04 ASSESSMENT — PAIN SCALES - GENERAL
PAINLEVEL_OUTOF10: 4
PAINLEVEL_OUTOF10: 8
PAINLEVEL_OUTOF10: 0
PAINLEVEL_OUTOF10: 0
PAINLEVEL_OUTOF10: 8
PAINLEVEL_OUTOF10: 0
PAINLEVEL_OUTOF10: 4
PAINLEVEL_OUTOF10: 3

## 2017-12-04 NOTE — PROGRESS NOTES
0110 Dr. Juan M Sood paged in regards to CTA. with possible acute LLL PE. Received orders for bilateral lower PEs and ABGs. Dave Davenport he will be up to see patient    0138 Dr. Juan M Sood at bedside.  Received orders for Fentanyl 25-50mcgs Q4 hours PRN for pain
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
## 2017-12-04 NOTE — PLAN OF CARE
Problem: Anxiety/Stress:  Goal: Level of anxiety will decrease  Level of anxiety will decrease   Outcome: Ongoing  Emotional support given. Precedex restarted for agitation/anxiety. Family and pt educated on anxiety relieveing techniques.

## 2017-12-04 NOTE — FLOWSHEET NOTE
12/4/17 0830 Patient assessed. Alert and oriented and follows commands x4. Denies pain at the time. 0930 Physical therapy in room. 0945 Speech therapy in room. General diet placed. 1000 Patient sitting up in bed eating breakfast. Weaning Precedex.
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
## 2017-12-04 NOTE — PROGRESS NOTES
Arouses to name-but drowsy. hand grasp strong to command. Pedal push/pull strong. Denies nausea or pain at this time. Mother at bedside. 1015- Weaning precedex as tolerated. 1145- Incont small loose stools- linens changed as needed. 1330- Restless at times but does follow commands. Oriented to direct questions. Wife at bedside-  1420- Precedex off.    1530- Took sips of water and applesauce without signs of aspiration. 1730- Haldol 5 mg given for restlessness. [address] [facility] [place] changed. Partial bath done. Family here-no c/o pain.   6128- Report given to Yonatan Jeff
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
## 2017-12-04 NOTE — PROGRESS NOTES
Today the patient is following some 2 stage commands. Awake and alert. A: JENNIFER. P: Discussed with his family. I will sign off. OK to transfer out of ICU from my standpoint. Will need rehab.
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
## 2017-12-05 ENCOUNTER — APPOINTMENT (OUTPATIENT)
Dept: CT IMAGING | Age: 32
DRG: 082 | End: 2017-12-05
Payer: COMMERCIAL

## 2017-12-05 PROBLEM — E66.01 MORBID OBESITY WITH BODY MASS INDEX (BMI) OF 40.0 TO 44.9 IN ADULT (HCC): Status: RESOLVED | Noted: 2017-11-28 | Resolved: 2017-12-05

## 2017-12-05 LAB
ALBUMIN SERPL-MCNC: 3.6 G/DL (ref 3.5–5.1)
ALP BLD-CCNC: 81 U/L (ref 38–126)
ALT SERPL-CCNC: 79 U/L (ref 11–66)
ANION GAP SERPL CALCULATED.3IONS-SCNC: 13 MEQ/L (ref 8–16)
AST SERPL-CCNC: 34 U/L (ref 5–40)
BASOPHILS # BLD: 0.3 %
BASOPHILS ABSOLUTE: 0 THOU/MM3 (ref 0–0.1)
BILIRUB SERPL-MCNC: 1.1 MG/DL (ref 0.3–1.2)
BUN BLDV-MCNC: 14 MG/DL (ref 7–22)
CALCIUM SERPL-MCNC: 9.7 MG/DL (ref 8.5–10.5)
CHLORIDE BLD-SCNC: 101 MEQ/L (ref 98–111)
CO2: 24 MEQ/L (ref 23–33)
CREAT SERPL-MCNC: 0.4 MG/DL (ref 0.4–1.2)
EKG ATRIAL RATE: 118 BPM
EKG P AXIS: 33 DEGREES
EKG P-R INTERVAL: 158 MS
EKG Q-T INTERVAL: 326 MS
EKG QRS DURATION: 108 MS
EKG QTC CALCULATION (BAZETT): 456 MS
EKG R AXIS: -25 DEGREES
EKG T AXIS: 53 DEGREES
EKG VENTRICULAR RATE: 118 BPM
EOSINOPHIL # BLD: 3.6 %
EOSINOPHILS ABSOLUTE: 0.2 THOU/MM3 (ref 0–0.4)
GFR SERPL CREATININE-BSD FRML MDRD: > 90 ML/MIN/1.73M2
GLUCOSE BLD-MCNC: 106 MG/DL (ref 70–108)
GLUCOSE BLD-MCNC: 136 MG/DL (ref 70–108)
HCT VFR BLD CALC: 48.8 % (ref 42–52)
HEMOGLOBIN: 16.5 GM/DL (ref 14–18)
LYMPHOCYTES # BLD: 18.3 %
LYMPHOCYTES ABSOLUTE: 1.1 THOU/MM3 (ref 1–4.8)
MAGNESIUM: 1.8 MG/DL (ref 1.6–2.4)
MCH RBC QN AUTO: 28.7 PG (ref 27–31)
MCHC RBC AUTO-ENTMCNC: 33.8 GM/DL (ref 33–37)
MCV RBC AUTO: 84.8 FL (ref 80–94)
MONOCYTES # BLD: 12.6 %
MONOCYTES ABSOLUTE: 0.7 THOU/MM3 (ref 0.4–1.3)
NUCLEATED RED BLOOD CELLS: 0 /100 WBC
PDW BLD-RTO: 13 % (ref 11.5–14.5)
PHOSPHORUS: 4.2 MG/DL (ref 2.4–4.7)
PLATELET # BLD: 342 THOU/MM3 (ref 130–400)
PMV BLD AUTO: 8.1 MCM (ref 7.4–10.4)
POTASSIUM SERPL-SCNC: 3.4 MEQ/L (ref 3.5–5.2)
RBC # BLD: 5.75 MILL/MM3 (ref 4.7–6.1)
SEG NEUTROPHILS: 65.2 %
SEGMENTED NEUTROPHILS ABSOLUTE COUNT: 3.8 THOU/MM3 (ref 1.8–7.7)
SODIUM BLD-SCNC: 138 MEQ/L (ref 135–145)
TOTAL PROTEIN: 7 G/DL (ref 6.1–8)
WBC # BLD: 5.9 THOU/MM3 (ref 4.8–10.8)

## 2017-12-05 PROCEDURE — 70450 CT HEAD/BRAIN W/O DYE: CPT

## 2017-12-05 PROCEDURE — A4421 OSTOMY SUPPLY MISC: HCPCS

## 2017-12-05 PROCEDURE — A4450 NON-WATERPROOF TAPE: HCPCS

## 2017-12-05 PROCEDURE — 80053 COMPREHEN METABOLIC PANEL: CPT

## 2017-12-05 PROCEDURE — 85025 COMPLETE CBC W/AUTO DIFF WBC: CPT

## 2017-12-05 PROCEDURE — 97112 NEUROMUSCULAR REEDUCATION: CPT

## 2017-12-05 PROCEDURE — 1210000002 HC MED SURG R&B - NEUROSCIENCE

## 2017-12-05 PROCEDURE — 84100 ASSAY OF PHOSPHORUS: CPT

## 2017-12-05 PROCEDURE — 83735 ASSAY OF MAGNESIUM: CPT

## 2017-12-05 PROCEDURE — 97110 THERAPEUTIC EXERCISES: CPT

## 2017-12-05 PROCEDURE — 82948 REAGENT STRIP/BLOOD GLUCOSE: CPT

## 2017-12-05 PROCEDURE — 97530 THERAPEUTIC ACTIVITIES: CPT

## 2017-12-05 PROCEDURE — 6370000000 HC RX 637 (ALT 250 FOR IP): Performed by: NURSE PRACTITIONER

## 2017-12-05 PROCEDURE — 97535 SELF CARE MNGMENT TRAINING: CPT

## 2017-12-05 PROCEDURE — 2580000003 HC RX 258: Performed by: SURGERY

## 2017-12-05 PROCEDURE — 99999 PR OFFICE/OUTPT VISIT,PROCEDURE ONLY: CPT | Performed by: NURSE PRACTITIONER

## 2017-12-05 PROCEDURE — 36415 COLL VENOUS BLD VENIPUNCTURE: CPT

## 2017-12-05 PROCEDURE — 6360000002 HC RX W HCPCS: Performed by: INTERNAL MEDICINE

## 2017-12-05 PROCEDURE — 99232 SBSQ HOSP IP/OBS MODERATE 35: CPT | Performed by: SURGERY

## 2017-12-05 PROCEDURE — 6370000000 HC RX 637 (ALT 250 FOR IP): Performed by: INTERNAL MEDICINE

## 2017-12-05 RX ADMIN — Medication 10 ML: at 22:08

## 2017-12-05 RX ADMIN — FAMOTIDINE 20 MG: 20 TABLET, FILM COATED ORAL at 10:47

## 2017-12-05 RX ADMIN — METOPROLOL TARTRATE 25 MG: 25 TABLET ORAL at 21:59

## 2017-12-05 RX ADMIN — Medication 10 ML: at 10:53

## 2017-12-05 RX ADMIN — METOPROLOL TARTRATE 25 MG: 25 TABLET ORAL at 10:47

## 2017-12-05 RX ADMIN — FAMOTIDINE 20 MG: 20 TABLET, FILM COATED ORAL at 21:58

## 2017-12-05 RX ADMIN — ENOXAPARIN SODIUM 120 MG: 120 INJECTION SUBCUTANEOUS at 22:29

## 2017-12-05 RX ADMIN — BACITRACIN ZINC 1 G: 500 OINTMENT TOPICAL at 21:59

## 2017-12-05 RX ADMIN — ENOXAPARIN SODIUM 120 MG: 120 INJECTION SUBCUTANEOUS at 11:29

## 2017-12-05 RX ADMIN — DOCUSATE SODIUM 100 MG: 100 CAPSULE ORAL at 21:59

## 2017-12-05 RX ADMIN — BACITRACIN ZINC 1 G: 500 OINTMENT TOPICAL at 10:47

## 2017-12-05 RX ADMIN — HYDROMORPHONE HYDROCHLORIDE 0.25 MG: 1 INJECTION, SOLUTION INTRAMUSCULAR; INTRAVENOUS; SUBCUTANEOUS at 03:34

## 2017-12-05 ASSESSMENT — PAIN SCALES - GENERAL
PAINLEVEL_OUTOF10: 0
PAINLEVEL_OUTOF10: 0
PAINLEVEL_OUTOF10: 4
PAINLEVEL_OUTOF10: 0

## 2017-12-05 NOTE — PROGRESS NOTES
Post-fall pharmacy consult    Pharmacy has been consulted to review medication profile for fall risk.   Medications increasing risk of falling: Haldol (prn, last given 12/3), Metoprolol, Dilaudid IV (last given 0334)  Medications increasing risk of bleeding: Lovenox (therapeutic dose for PE)  Findings discussed with ANDRÉS Rooney  Recommendations: Change to po pain meds as soon as appropriate, monitor for any signs of bleeding
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
## 2017-12-05 NOTE — PROGRESS NOTES
Nurse went into room. Abbie Tong with patient. Kathie Simpson, RN with patient. Patient was sitting on floor. Patient had a laceration on eyebrow- bleeding. No one was in room with patient when the patient fell per staff. Patient fell out of chair. Chair alarm on and alarming. Mother had stepped out of the room. This RN was at lunch and noticed a lot of commotion in patients room when coming back from lunch. Lucas Garrido, ANDRÉS watching patient. Therapy and mother was in room when RN stepped out for lunch. Chair alarm was on.

## 2017-12-05 NOTE — PROGRESS NOTES
[facility] THERAPY MISSED TREATMENT NOTE  [facility]      Date: 2017  Patient Name: Thai Nuñez        MRN: [id number]    : 1985  (33 y.o.)    REASON FOR MISSED TREATMENT:  Attempted to see patient for cognitive evaluation. ANDRÉS Rooney requests to hold evaluation this date and attempt  reporting \"patient fell earlier today and hit his head, patient just fell asleep. \"  Will plan to see patient .     BRANDT Lynch 23
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
## 2017-12-06 LAB
ANION GAP SERPL CALCULATED.3IONS-SCNC: 18 MEQ/L (ref 8–16)
BUN BLDV-MCNC: 14 MG/DL (ref 7–22)
CALCIUM SERPL-MCNC: 9.6 MG/DL (ref 8.5–10.5)
CHLORIDE BLD-SCNC: 100 MEQ/L (ref 98–111)
CO2: 25 MEQ/L (ref 23–33)
CREAT SERPL-MCNC: 0.5 MG/DL (ref 0.4–1.2)
GFR SERPL CREATININE-BSD FRML MDRD: > 90 ML/MIN/1.73M2
GLUCOSE BLD-MCNC: 114 MG/DL (ref 70–108)
HCT VFR BLD CALC: 50 % (ref 42–52)
HEMOGLOBIN: 17.4 GM/DL (ref 14–18)
MCH RBC QN AUTO: 29.5 PG (ref 27–31)
MCHC RBC AUTO-ENTMCNC: 34.7 GM/DL (ref 33–37)
MCV RBC AUTO: 84.9 FL (ref 80–94)
PDW BLD-RTO: 12.7 % (ref 11.5–14.5)
PLATELET # BLD: 337 THOU/MM3 (ref 130–400)
PMV BLD AUTO: 8.7 MCM (ref 7.4–10.4)
POTASSIUM SERPL-SCNC: 3.5 MEQ/L (ref 3.5–5.2)
RBC # BLD: 5.88 MILL/MM3 (ref 4.7–6.1)
SODIUM BLD-SCNC: 143 MEQ/L (ref 135–145)
T4 FREE: 2.77 NG/DL (ref 0.93–1.76)
TSH SERPL DL<=0.05 MIU/L-ACNC: < 0.005 UIU/ML (ref 0.4–4.2)
WBC # BLD: 5.4 THOU/MM3 (ref 4.8–10.8)

## 2017-12-06 PROCEDURE — 97116 GAIT TRAINING THERAPY: CPT

## 2017-12-06 PROCEDURE — 6360000002 HC RX W HCPCS: Performed by: INTERNAL MEDICINE

## 2017-12-06 PROCEDURE — 80048 BASIC METABOLIC PNL TOTAL CA: CPT

## 2017-12-06 PROCEDURE — 97110 THERAPEUTIC EXERCISES: CPT

## 2017-12-06 PROCEDURE — 6370000000 HC RX 637 (ALT 250 FOR IP): Performed by: INTERNAL MEDICINE

## 2017-12-06 PROCEDURE — 2580000003 HC RX 258: Performed by: SURGERY

## 2017-12-06 PROCEDURE — 84443 ASSAY THYROID STIM HORMONE: CPT

## 2017-12-06 PROCEDURE — 36415 COLL VENOUS BLD VENIPUNCTURE: CPT

## 2017-12-06 PROCEDURE — 2580000003 HC RX 258: Performed by: INTERNAL MEDICINE

## 2017-12-06 PROCEDURE — 6370000000 HC RX 637 (ALT 250 FOR IP): Performed by: NURSE PRACTITIONER

## 2017-12-06 PROCEDURE — 97530 THERAPEUTIC ACTIVITIES: CPT

## 2017-12-06 PROCEDURE — 85027 COMPLETE CBC AUTOMATED: CPT

## 2017-12-06 PROCEDURE — 84439 ASSAY OF FREE THYROXINE: CPT

## 2017-12-06 PROCEDURE — 99233 SBSQ HOSP IP/OBS HIGH 50: CPT | Performed by: SURGERY

## 2017-12-06 PROCEDURE — 99232 SBSQ HOSP IP/OBS MODERATE 35: CPT | Performed by: INTERNAL MEDICINE

## 2017-12-06 PROCEDURE — 1210000002 HC MED SURG R&B - NEUROSCIENCE

## 2017-12-06 PROCEDURE — 99999 PR OFFICE/OUTPT VISIT,PROCEDURE ONLY: CPT | Performed by: PHYSICIAN ASSISTANT

## 2017-12-06 PROCEDURE — 1200000003 HC TELEMETRY R&B

## 2017-12-06 PROCEDURE — 92523 SPEECH SOUND LANG COMPREHEN: CPT

## 2017-12-06 RX ORDER — SODIUM CHLORIDE 9 MG/ML
INJECTION, SOLUTION INTRAVENOUS CONTINUOUS
Status: DISCONTINUED | OUTPATIENT
Start: 2017-12-06 | End: 2017-12-08 | Stop reason: HOSPADM

## 2017-12-06 RX ORDER — POTASSIUM CHLORIDE 750 MG/1
40 TABLET, FILM COATED, EXTENDED RELEASE ORAL ONCE
Status: COMPLETED | OUTPATIENT
Start: 2017-12-06 | End: 2017-12-06

## 2017-12-06 RX ADMIN — BACITRACIN ZINC 1 G: 500 OINTMENT TOPICAL at 08:26

## 2017-12-06 RX ADMIN — METOPROLOL TARTRATE 25 MG: 25 TABLET ORAL at 08:25

## 2017-12-06 RX ADMIN — BACITRACIN ZINC 1 G: 500 OINTMENT TOPICAL at 21:16

## 2017-12-06 RX ADMIN — DOCUSATE SODIUM 100 MG: 100 CAPSULE ORAL at 08:26

## 2017-12-06 RX ADMIN — ENOXAPARIN SODIUM 120 MG: 120 INJECTION SUBCUTANEOUS at 21:16

## 2017-12-06 RX ADMIN — POTASSIUM CHLORIDE 40 MEQ: 750 TABLET, FILM COATED, EXTENDED RELEASE ORAL at 13:00

## 2017-12-06 RX ADMIN — SENNA 17.2 MG: 8.6 TABLET, COATED ORAL at 21:16

## 2017-12-06 RX ADMIN — POLYETHYLENE GLYCOL 3350 17 G: 17 POWDER, FOR SOLUTION ORAL at 08:26

## 2017-12-06 RX ADMIN — Medication 10 ML: at 08:26

## 2017-12-06 RX ADMIN — FAMOTIDINE 20 MG: 20 TABLET, FILM COATED ORAL at 21:16

## 2017-12-06 RX ADMIN — FAMOTIDINE 20 MG: 20 TABLET, FILM COATED ORAL at 08:26

## 2017-12-06 RX ADMIN — METOPROLOL TARTRATE 25 MG: 25 TABLET ORAL at 21:16

## 2017-12-06 RX ADMIN — SODIUM CHLORIDE: 9 INJECTION, SOLUTION INTRAVENOUS at 15:20

## 2017-12-06 RX ADMIN — DOCUSATE SODIUM 100 MG: 100 CAPSULE ORAL at 21:16

## 2017-12-06 RX ADMIN — ENOXAPARIN SODIUM 120 MG: 120 INJECTION SUBCUTANEOUS at 08:26

## 2017-12-06 ASSESSMENT — PAIN SCALES - GENERAL
PAINLEVEL_OUTOF10: 0
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
## 2017-12-06 NOTE — PROGRESS NOTES
fatigue  Activity Tolerance: pt had to take frequent rest breaks throughout therapy session due to fatigue and HR increase with activity. Pt able to follow 75%-100% of commands     Assessment:     Performance deficits / Impairments: Decreased functional mobility , Decreased safe awareness, Decreased balance, Decreased ADL status, Decreased cognition, Decreased ROM, Decreased endurance, Decreased high-level IADLs, Decreased strength  Prognosis: Good  Discharge Recommendations: IP Rehab    Patient Education:  Patient Education: safety with transfers and posture     Equipment Recommendations:   Other: cont to assess    Safety:  Safety Devices in place: Yes  Type of devices: Call light within reach, Chair alarm in place, Gait belt, Left in chair, All fall risk precautions in place, Nurse notified    Plan:  Times per week: 7x  Current Treatment Recommendations: Strengthening, Endurance Training, Patient/Caregiver Education & Training, Equipment Evaluation, Education, & procurement, Self-Care / ADL, Cognitive Reorientation, Balance Training, Home Management Training, Cognitive/Perceptual Training, Functional Mobility Training, Safety Education & Training    Goals:  Patient goals : None stated    Short term goals  Time Frame for Short term goals: 2 weeks  Short term goal 1: Pt will sit EOB x 15 min with S and min cues for midline orientation to increase endurance for bedside ADL routine  Short term goal 2: pt will follow 1 steps commands with 100% accuracy to increase independence in following basic commands for grooming and dressing tasks  Short term goal 3: Pt to complete functional mobility using AD to/from bathroom with min A x1 and no vcs for problem solving/seqeuncing of tasks to increase ndep with toileting   Short term goal 4: Pt to complete transfers from various surfaces including BSC/toilet with min A x1 and good balance and no vcs for safety to increase indep with toielting tasks   Short term goal 5: Pt to

## 2017-12-06 NOTE — PLAN OF CARE
Problem: Risk for Impaired Skin Integrity  Goal: Tissue integrity - skin and mucous membranes  Structural intactness and normal physiological function of skin and  mucous membranes. Outcome: Ongoing  Pt has laceration on right eyebrow. Steri- strips applied to laceration. Pt has laceration on right ear- abx ointment applied. Pt has abrasion on left hand, redness on upper chest and redness on buttocks that blanches. Pt turns self. Continuing to monitor. Problem: Discharge Planning:  Goal: Participates in care planning  Participates in care planning   Outcome: Ongoing  Pt from private residence. Exact discharge date and plans unsure at this time. Problem: Airway Clearance - Ineffective:  Goal: Ability to maintain a clear airway will improve  Ability to maintain a clear airway will improve   Outcome: Ongoing  Pt's 02 saturation >92% while on room air. Pt's lung sounds clear, but diminished at this time. Pt tolerating thin liquids well. Continuing to monitor. Problem: Anxiety/Stress:  Goal: Level of anxiety will decrease  Level of anxiety will decrease   Outcome: Ongoing  Pt very calm, with flat affect so far during shift. Problem: Nutrition Deficit:  Goal: Ability to achieve adequate nutritional intake will improve  Ability to achieve adequate nutritional intake will improve   Outcome: Ongoing  Pt on general diet. Pt's appetite adequate at this time. Continuing to monitor. Problem: Pain:  Goal: Pain level will decrease  Pain level will decrease   Outcome: Ongoing  Pt has not complained of any pain so far this shift. Pt's pain goal is \"keep me comfortable\". Problem: Falls - Risk of  Goal: Absence of falls  Outcome: Ongoing  Pt fell earlier today. Pt up with two assist. Pt's bilateral lower extremities are unsteady when ambulating. Problem: HEMODYNAMIC STATUS  Goal: Patient has stable vital signs and fluid balance  Outcome: Ongoing  Pt's vital signs stable at this time. Continuing to monitor.
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
## 2017-12-07 LAB
ANION GAP SERPL CALCULATED.3IONS-SCNC: 12 MEQ/L (ref 8–16)
BUN BLDV-MCNC: 14 MG/DL (ref 7–22)
CALCIUM SERPL-MCNC: 9.7 MG/DL (ref 8.5–10.5)
CHLORIDE BLD-SCNC: 101 MEQ/L (ref 98–111)
CO2: 28 MEQ/L (ref 23–33)
CREAT SERPL-MCNC: 0.6 MG/DL (ref 0.4–1.2)
GFR SERPL CREATININE-BSD FRML MDRD: > 90 ML/MIN/1.73M2
GLUCOSE BLD-MCNC: 105 MG/DL (ref 70–108)
HCT VFR BLD CALC: 49.4 % (ref 42–52)
HEMOGLOBIN: 16.9 GM/DL (ref 14–18)
MCH RBC QN AUTO: 29.4 PG (ref 27–31)
MCHC RBC AUTO-ENTMCNC: 34.2 GM/DL (ref 33–37)
MCV RBC AUTO: 85.8 FL (ref 80–94)
PDW BLD-RTO: 13 % (ref 11.5–14.5)
PLATELET # BLD: 326 THOU/MM3 (ref 130–400)
PMV BLD AUTO: 8.5 MCM (ref 7.4–10.4)
POTASSIUM SERPL-SCNC: 4 MEQ/L (ref 3.5–5.2)
RBC # BLD: 5.75 MILL/MM3 (ref 4.7–6.1)
SODIUM BLD-SCNC: 141 MEQ/L (ref 135–145)
T3 FREE: 8.74 PG/ML (ref 2.02–4.43)
WBC # BLD: 5.4 THOU/MM3 (ref 4.8–10.8)

## 2017-12-07 PROCEDURE — 6370000000 HC RX 637 (ALT 250 FOR IP): Performed by: NURSE PRACTITIONER

## 2017-12-07 PROCEDURE — APPSS45 APP SPLIT SHARED TIME 31-45 MINUTES: Performed by: PHYSICIAN ASSISTANT

## 2017-12-07 PROCEDURE — 97110 THERAPEUTIC EXERCISES: CPT

## 2017-12-07 PROCEDURE — 85027 COMPLETE CBC AUTOMATED: CPT

## 2017-12-07 PROCEDURE — 2580000003 HC RX 258: Performed by: INTERNAL MEDICINE

## 2017-12-07 PROCEDURE — 84445 ASSAY OF TSI GLOBULIN: CPT

## 2017-12-07 PROCEDURE — 97532 HC COGNITIVE THERAPY 15 MIN: CPT

## 2017-12-07 PROCEDURE — 99232 SBSQ HOSP IP/OBS MODERATE 35: CPT | Performed by: INTERNAL MEDICINE

## 2017-12-07 PROCEDURE — 6360000002 HC RX W HCPCS: Performed by: INTERNAL MEDICINE

## 2017-12-07 PROCEDURE — 1200000003 HC TELEMETRY R&B

## 2017-12-07 PROCEDURE — 97116 GAIT TRAINING THERAPY: CPT

## 2017-12-07 PROCEDURE — 6370000000 HC RX 637 (ALT 250 FOR IP): Performed by: INTERNAL MEDICINE

## 2017-12-07 PROCEDURE — 36415 COLL VENOUS BLD VENIPUNCTURE: CPT

## 2017-12-07 PROCEDURE — 80048 BASIC METABOLIC PNL TOTAL CA: CPT

## 2017-12-07 PROCEDURE — 84481 FREE ASSAY (FT-3): CPT

## 2017-12-07 PROCEDURE — 99233 SBSQ HOSP IP/OBS HIGH 50: CPT | Performed by: SURGERY

## 2017-12-07 PROCEDURE — 97530 THERAPEUTIC ACTIVITIES: CPT

## 2017-12-07 PROCEDURE — 97112 NEUROMUSCULAR REEDUCATION: CPT

## 2017-12-07 RX ADMIN — DOCUSATE SODIUM 100 MG: 100 CAPSULE ORAL at 09:09

## 2017-12-07 RX ADMIN — ENOXAPARIN SODIUM 120 MG: 120 INJECTION SUBCUTANEOUS at 21:04

## 2017-12-07 RX ADMIN — FAMOTIDINE 20 MG: 20 TABLET, FILM COATED ORAL at 21:04

## 2017-12-07 RX ADMIN — DOCUSATE SODIUM 100 MG: 100 CAPSULE ORAL at 21:04

## 2017-12-07 RX ADMIN — FAMOTIDINE 20 MG: 20 TABLET, FILM COATED ORAL at 09:10

## 2017-12-07 RX ADMIN — BACITRACIN ZINC 1 G: 500 OINTMENT TOPICAL at 21:07

## 2017-12-07 RX ADMIN — ENOXAPARIN SODIUM 120 MG: 120 INJECTION SUBCUTANEOUS at 09:09

## 2017-12-07 RX ADMIN — SODIUM CHLORIDE: 9 INJECTION, SOLUTION INTRAVENOUS at 05:41

## 2017-12-07 RX ADMIN — METOPROLOL TARTRATE 25 MG: 25 TABLET ORAL at 21:04

## 2017-12-07 RX ADMIN — BACITRACIN ZINC 1 G: 500 OINTMENT TOPICAL at 09:09

## 2017-12-07 RX ADMIN — METOPROLOL TARTRATE 25 MG: 25 TABLET ORAL at 09:09

## 2017-12-07 RX ADMIN — SENNA 17.2 MG: 8.6 TABLET, COATED ORAL at 21:03

## 2017-12-07 ASSESSMENT — PAIN SCALES - GENERAL
PAINLEVEL_OUTOF10: 0

## 2017-12-07 NOTE — PROGRESS NOTES
transfers  Transfers  Sit to Stand: Contact guard assistance (x 2 from recliner after use of Leanne Plus; pt with good carryover of hand placement, verbal cues for increased anterior pelvic tilt)  Stand to sit: Minimal Assistance (constant verbal cues for increased hip/knee and trunk flexion to control descent)  Bed to Chair: Minimal assistance (x2 with use of walker)  Comment: Pt transfers sit <-->  [address] [place] [address] Drive with CGA and constant verbal cues for anterior pelvic tilt as pt pushes posteriorly    Current functional status for toilet transfers  Moderate assistance X2     Current functional status for locomotion  Ambulation 1  Surface: level tile  Device:  (Leanne Plus with walking harness)  Assistance: Minimal assistance  Quality of Gait: Pt amb with LAHTI Plus to facilitate anterior weight shift over PIPPA, min A for verbal and manual cues at L buttock to facilitate hip ext during single leg stance due to increased hip retraction, consistent verbal cues for increased PIPPA, occasional scissoring-like movements with R foot. Distance: 100 feet  Comments: Additional assist to guide LAHTI Plus, pt able to advance on own    Current functional status for bladder management: Modified independence    Current functional status for bowel management: Moderate assistance    Current functional status for comprehension: Moderate assistance    Current functional status for expression: Moderate assistance    Current functional status for social interaction: Moderate assistance    Current functional status for problem solving: Moderate assistance    Current functional status for memory:  Moderate assistance    Expected level of Improvement in Self-Care: Pt to complete grooming tasks with SBA and no vcs fro sequencing or problem solving     Expected level of Improvement in Sphincter Control:  Modified independence    Expected level of Improvement in Transfers: Pt to complete transfers from various surfaces including BSC/toilet with min A x1 and good balance and no vcs for safety to increase indep with toielting tasks     Expected level of Improvement in Locomotion:  Pt to ambulate 50 feet with LRAD CGA for household ambulation    Expected level of Improvement in Communication and Social Cognition: will complete mental math computation tasks with 60% accuracy given mod cues to improve attention and numerical reasoning. Expected length of time to achieve that level of improvement: 2 weeks    Current rehab issues: ADL dysfunction,bladder management, bowel management, carry over of therapy techniques, discharge planning, disease and co-morbidity management, gait/mobility dysfunction, medication management, nutrition and hydration management, Ongoing assessment of safety, Pain management, Patient and family education, Prevention of secondary complications, Skin Integrity, cognitive impairment, communication impairment. Required therapy: Physical Therapy, Occupational Therapy and Speech Therapy 3 hours per day, 5-6 days per week. Recreational Therapy 1 hour per week. Expected Discharge Destination: Home    Expected Post Discharge Treatments: Out Patient and Home Care    Other information relevant to the care needs: Safety    Acute Inpatient Rehabilitation Disclosure Statement provided to patient. Patient verbalized understanding. I have reviewed and concur with the findings and results of the pre-admission screening assessment completed by the Inpatient Rehabilitation Admissions Coordinator.     Moiz Begum MD

## 2017-12-07 NOTE — PROGRESS NOTES
limited by fatigue  Activity Tolerance: pt did have RB throughout therapy session and had increase with HR while standing and sweating noted on forehead. Pt did follow all commands however is impulsive at times and requires things repeated as he gets off task     Assessment:     Performance deficits / Impairments: Decreased functional mobility , Decreased safe awareness, Decreased balance, Decreased ADL status, Decreased cognition, Decreased ROM, Decreased endurance, Decreased high-level IADLs, Decreased strength  Prognosis: Good  Discharge Recommendations: IP Rehab    Patient Education:  Patient Education: safety with transfer and taking time for safety   Barriers to Learning: TBI    Equipment Recommendations:   Other: cont to assess    Safety:  Safety Devices in place: Yes  Type of devices: Call light within reach, Left in chair, All fall risk precautions in place, Gait belt, Nurse notified (mother with pt when left pt in recliner )    Plan:  Times per week: 7x  Current Treatment Recommendations: Strengthening, Endurance Training, Patient/Caregiver Education & Training, Equipment Evaluation, Education, & procurement, Self-Care / ADL, Cognitive Reorientation, Balance Training, Home Management Training, Cognitive/Perceptual Training, Functional Mobility Training, Safety Education & Training    Goals:  Patient goals : None stated    Short term goals  Time Frame for Short term goals: 2 weeks  Short term goal 1: Pt will sit EOB x 15 min with S and min cues for midline orientation to increase endurance for bedside ADL routine  Short term goal 2: pt will follow 1 steps commands with 100% accuracy to increase independence in following basic commands for grooming and dressing tasks  Short term goal 3: Pt to complete functional mobility using AD to/from bathroom with min A x1 and no vcs for problem solving/seqeuncing of tasks to increase ndep with toileting   Short term goal 4: Pt to complete transfers from various surfaces including BSC/toilet with min A x1 and good balance and no vcs for safety to increase indep with toielting tasks   Short term goal 5: Pt to complete grooming tasks with SBA and no vcs fro sequencing or problem solvign during   Long term goals  Time Frame for Long term goals : Not set due to est short LOS         AM-PAC Inpatient Daily Activity Raw Score: 15  AM-PAC Inpatient ADL T-Scale Score : 34.69  ADL Inpatient CMS 0-100% Score: 56.46  ADL Inpatient CMS G-Code Modifier : CK
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
## 2017-12-08 ENCOUNTER — APPOINTMENT (OUTPATIENT)
Dept: ULTRASOUND IMAGING | Age: 32
DRG: 949 | End: 2017-12-08
Attending: PHYSICAL MEDICINE & REHABILITATION
Payer: COMMERCIAL

## 2017-12-08 ENCOUNTER — HOSPITAL ENCOUNTER (INPATIENT)
Age: 32
LOS: 7 days | Discharge: HOME | DRG: 949 | End: 2017-12-15
Attending: PHYSICAL MEDICINE & REHABILITATION | Admitting: PHYSICAL MEDICINE & REHABILITATION
Payer: COMMERCIAL

## 2017-12-08 VITALS
HEIGHT: 75 IN | WEIGHT: 274 LBS | SYSTOLIC BLOOD PRESSURE: 133 MMHG | HEART RATE: 82 BPM | OXYGEN SATURATION: 97 % | TEMPERATURE: 98.6 F | BODY MASS INDEX: 34.07 KG/M2 | RESPIRATION RATE: 16 BRPM | DIASTOLIC BLOOD PRESSURE: 78 MMHG

## 2017-12-08 DIAGNOSIS — S06.2X1S: ICD-10-CM

## 2017-12-08 DIAGNOSIS — D18.03 HEPATIC HEMANGIOMA: ICD-10-CM

## 2017-12-08 DIAGNOSIS — R74.8 ELEVATED LIVER ENZYMES: ICD-10-CM

## 2017-12-08 DIAGNOSIS — I26.99 OTHER ACUTE PULMONARY EMBOLISM WITHOUT ACUTE COR PULMONALE (HCC): Primary | ICD-10-CM

## 2017-12-08 PROBLEM — E04.2 MULTINODULAR GOITER: Status: ACTIVE | Noted: 2017-12-08

## 2017-12-08 PROBLEM — S06.2XAA DIFFUSE AXONAL BRAIN INJURY: Status: ACTIVE | Noted: 2017-12-08

## 2017-12-08 LAB
ANION GAP SERPL CALCULATED.3IONS-SCNC: 12 MEQ/L (ref 8–16)
BUN BLDV-MCNC: 12 MG/DL (ref 7–22)
CALCIUM SERPL-MCNC: 9.5 MG/DL (ref 8.5–10.5)
CHLORIDE BLD-SCNC: 100 MEQ/L (ref 98–111)
CO2: 28 MEQ/L (ref 23–33)
CREAT SERPL-MCNC: 0.5 MG/DL (ref 0.4–1.2)
GFR SERPL CREATININE-BSD FRML MDRD: > 90 ML/MIN/1.73M2
GLUCOSE BLD-MCNC: 106 MG/DL (ref 70–108)
HCT VFR BLD CALC: 48.1 % (ref 42–52)
HEMOGLOBIN: 16.7 GM/DL (ref 14–18)
INR BLD: 1.17 (ref 0.85–1.13)
MCH RBC QN AUTO: 29.8 PG (ref 27–31)
MCHC RBC AUTO-ENTMCNC: 34.8 GM/DL (ref 33–37)
MCV RBC AUTO: 85.7 FL (ref 80–94)
PDW BLD-RTO: 12.7 % (ref 11.5–14.5)
PLATELET # BLD: 325 THOU/MM3 (ref 130–400)
PMV BLD AUTO: 9.1 MCM (ref 7.4–10.4)
POTASSIUM SERPL-SCNC: 4 MEQ/L (ref 3.5–5.2)
RBC # BLD: 5.62 MILL/MM3 (ref 4.7–6.1)
SODIUM BLD-SCNC: 140 MEQ/L (ref 135–145)
WBC # BLD: 4.9 THOU/MM3 (ref 4.8–10.8)

## 2017-12-08 PROCEDURE — 80048 BASIC METABOLIC PNL TOTAL CA: CPT

## 2017-12-08 PROCEDURE — 76536 US EXAM OF HEAD AND NECK: CPT

## 2017-12-08 PROCEDURE — 97530 THERAPEUTIC ACTIVITIES: CPT

## 2017-12-08 PROCEDURE — 97532 HC COGNITIVE THERAPY 15 MIN: CPT

## 2017-12-08 PROCEDURE — 6360000002 HC RX W HCPCS: Performed by: INTERNAL MEDICINE

## 2017-12-08 PROCEDURE — APPSS45 APP SPLIT SHARED TIME 31-45 MINUTES: Performed by: PHYSICIAN ASSISTANT

## 2017-12-08 PROCEDURE — 6370000000 HC RX 637 (ALT 250 FOR IP): Performed by: INTERNAL MEDICINE

## 2017-12-08 PROCEDURE — 2580000003 HC RX 258: Performed by: SURGERY

## 2017-12-08 PROCEDURE — 2580000003 HC RX 258: Performed by: PHYSICIAN ASSISTANT

## 2017-12-08 PROCEDURE — 36415 COLL VENOUS BLD VENIPUNCTURE: CPT

## 2017-12-08 PROCEDURE — 85610 PROTHROMBIN TIME: CPT

## 2017-12-08 PROCEDURE — 6370000000 HC RX 637 (ALT 250 FOR IP): Performed by: NURSE PRACTITIONER

## 2017-12-08 PROCEDURE — 99233 SBSQ HOSP IP/OBS HIGH 50: CPT | Performed by: SURGERY

## 2017-12-08 PROCEDURE — 85027 COMPLETE CBC AUTOMATED: CPT

## 2017-12-08 PROCEDURE — 6370000000 HC RX 637 (ALT 250 FOR IP): Performed by: PHYSICIAN ASSISTANT

## 2017-12-08 PROCEDURE — APPNB45 APP NON BILLABLE 31-45 MINUTES: Performed by: PHYSICIAN ASSISTANT

## 2017-12-08 PROCEDURE — 6370000000 HC RX 637 (ALT 250 FOR IP): Performed by: PHYSICAL MEDICINE & REHABILITATION

## 2017-12-08 PROCEDURE — 1180000000 HC REHAB R&B

## 2017-12-08 PROCEDURE — 97110 THERAPEUTIC EXERCISES: CPT

## 2017-12-08 RX ORDER — SODIUM CHLORIDE 0.9 % (FLUSH) 0.9 %
10 SYRINGE (ML) INJECTION EVERY 12 HOURS SCHEDULED
Status: CANCELLED | OUTPATIENT
Start: 2017-12-08

## 2017-12-08 RX ORDER — GINSENG 100 MG
1 CAPSULE ORAL 2 TIMES DAILY
Status: CANCELLED | OUTPATIENT
Start: 2017-12-08

## 2017-12-08 RX ORDER — IPRATROPIUM BROMIDE AND ALBUTEROL SULFATE 2.5; .5 MG/3ML; MG/3ML
1 SOLUTION RESPIRATORY (INHALATION) EVERY 4 HOURS PRN
Status: CANCELLED | OUTPATIENT
Start: 2017-12-08

## 2017-12-08 RX ORDER — DOCUSATE SODIUM 100 MG/1
100 CAPSULE, LIQUID FILLED ORAL 2 TIMES DAILY
Status: DISCONTINUED | OUTPATIENT
Start: 2017-12-08 | End: 2017-12-08

## 2017-12-08 RX ORDER — NICOTINE 21 MG/24HR
1 PATCH, TRANSDERMAL 24 HOURS TRANSDERMAL DAILY
Status: DISCONTINUED | OUTPATIENT
Start: 2017-12-09 | End: 2017-12-15 | Stop reason: HOSPADM

## 2017-12-08 RX ORDER — ACETAMINOPHEN 325 MG/1
650 TABLET ORAL EVERY 4 HOURS PRN
Status: DISCONTINUED | OUTPATIENT
Start: 2017-12-08 | End: 2017-12-15 | Stop reason: HOSPADM

## 2017-12-08 RX ORDER — SODIUM CHLORIDE 9 MG/ML
INJECTION, SOLUTION INTRAVENOUS CONTINUOUS
Status: CANCELLED | OUTPATIENT
Start: 2017-12-08

## 2017-12-08 RX ORDER — IPRATROPIUM BROMIDE AND ALBUTEROL SULFATE 2.5; .5 MG/3ML; MG/3ML
1 SOLUTION RESPIRATORY (INHALATION) EVERY 4 HOURS PRN
Status: DISCONTINUED | OUTPATIENT
Start: 2017-12-08 | End: 2017-12-15 | Stop reason: HOSPADM

## 2017-12-08 RX ORDER — NICOTINE 21 MG/24HR
1 PATCH, TRANSDERMAL 24 HOURS TRANSDERMAL DAILY
Status: CANCELLED | OUTPATIENT
Start: 2017-12-09

## 2017-12-08 RX ORDER — SODIUM CHLORIDE 0.9 % (FLUSH) 0.9 %
10 SYRINGE (ML) INJECTION PRN
Status: CANCELLED | OUTPATIENT
Start: 2017-12-08

## 2017-12-08 RX ORDER — GINSENG 100 MG
1 CAPSULE ORAL 2 TIMES DAILY
Status: DISCONTINUED | OUTPATIENT
Start: 2017-12-08 | End: 2017-12-15 | Stop reason: HOSPADM

## 2017-12-08 RX ORDER — WARFARIN SODIUM 7.5 MG/1
7.5 TABLET ORAL ONCE
Status: DISCONTINUED | OUTPATIENT
Start: 2017-12-08 | End: 2017-12-08

## 2017-12-08 RX ORDER — SODIUM CHLORIDE 9 MG/ML
INJECTION, SOLUTION INTRAVENOUS CONTINUOUS
Status: DISCONTINUED | OUTPATIENT
Start: 2017-12-08 | End: 2017-12-08

## 2017-12-08 RX ORDER — ACETAMINOPHEN 325 MG/1
650 TABLET ORAL EVERY 4 HOURS PRN
Status: CANCELLED | OUTPATIENT
Start: 2017-12-08

## 2017-12-08 RX ORDER — FAMOTIDINE 20 MG/1
20 TABLET, FILM COATED ORAL 2 TIMES DAILY
Status: DISCONTINUED | OUTPATIENT
Start: 2017-12-08 | End: 2017-12-15 | Stop reason: HOSPADM

## 2017-12-08 RX ORDER — SENNA PLUS 8.6 MG/1
2 TABLET ORAL NIGHTLY PRN
Status: DISCONTINUED | OUTPATIENT
Start: 2017-12-08 | End: 2017-12-09

## 2017-12-08 RX ORDER — FAMOTIDINE 20 MG/1
20 TABLET, FILM COATED ORAL 2 TIMES DAILY
Status: CANCELLED | OUTPATIENT
Start: 2017-12-08

## 2017-12-08 RX ORDER — DOCUSATE SODIUM 100 MG/1
100 CAPSULE, LIQUID FILLED ORAL 2 TIMES DAILY PRN
Status: DISCONTINUED | OUTPATIENT
Start: 2017-12-08 | End: 2017-12-15 | Stop reason: HOSPADM

## 2017-12-08 RX ORDER — SENNA PLUS 8.6 MG/1
2 TABLET ORAL NIGHTLY
Status: DISCONTINUED | OUTPATIENT
Start: 2017-12-08 | End: 2017-12-08

## 2017-12-08 RX ORDER — DOCUSATE SODIUM 100 MG/1
100 CAPSULE, LIQUID FILLED ORAL 2 TIMES DAILY
Status: CANCELLED | OUTPATIENT
Start: 2017-12-08

## 2017-12-08 RX ORDER — SENNA PLUS 8.6 MG/1
2 TABLET ORAL NIGHTLY
Status: CANCELLED | OUTPATIENT
Start: 2017-12-08

## 2017-12-08 RX ORDER — SODIUM CHLORIDE 0.9 % (FLUSH) 0.9 %
10 SYRINGE (ML) INJECTION EVERY 12 HOURS SCHEDULED
Status: DISCONTINUED | OUTPATIENT
Start: 2017-12-08 | End: 2017-12-12

## 2017-12-08 RX ORDER — SODIUM CHLORIDE 0.9 % (FLUSH) 0.9 %
10 SYRINGE (ML) INJECTION PRN
Status: DISCONTINUED | OUTPATIENT
Start: 2017-12-08 | End: 2017-12-15 | Stop reason: HOSPADM

## 2017-12-08 RX ADMIN — BACITRACIN 1 G: 500 OINTMENT TOPICAL at 22:13

## 2017-12-08 RX ADMIN — METOPROLOL TARTRATE 25 MG: 25 TABLET ORAL at 22:13

## 2017-12-08 RX ADMIN — FAMOTIDINE 20 MG: 20 TABLET, FILM COATED ORAL at 09:49

## 2017-12-08 RX ADMIN — ENOXAPARIN SODIUM 120 MG: 120 INJECTION SUBCUTANEOUS at 09:49

## 2017-12-08 RX ADMIN — Medication 10 ML: at 22:21

## 2017-12-08 RX ADMIN — RIVAROXABAN 15 MG: 15 TABLET, FILM COATED ORAL at 18:39

## 2017-12-08 RX ADMIN — DOCUSATE SODIUM 100 MG: 100 CAPSULE ORAL at 09:49

## 2017-12-08 RX ADMIN — BACITRACIN ZINC 1 G: 500 OINTMENT TOPICAL at 09:50

## 2017-12-08 RX ADMIN — METOPROLOL TARTRATE 25 MG: 25 TABLET ORAL at 09:50

## 2017-12-08 RX ADMIN — Medication 10 ML: at 09:50

## 2017-12-08 RX ADMIN — FAMOTIDINE 20 MG: 20 TABLET, FILM COATED ORAL at 22:13

## 2017-12-08 ASSESSMENT — PAIN SCALES - GENERAL
PAINLEVEL_OUTOF10: 0
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
## 2017-12-08 NOTE — CONSULTS
Obese Class I (33.9)  · Comparative Standards (Estimated Nutrition Needs):  · Estimated Daily Total Kcal: 2435 kcal/day  · Estimated Daily Protein (g): 116-136 g/day    Estimated Intake vs Estimated Needs: Intake Meets Needs (Current intake of meals appear to be meeting estimated nutritional needs)    Nutrition Risk Level: Moderate    Nutrition Interventions:   Continue current diet, Start ONS, Vitamin Supplement  Continued Inpatient Monitoring, Education Initiated (Encouraged adequate po intake and good protein sources with meals at best effort)    Nutrition Evaluation:   · Evaluation: Goals set   · Goals: Pt will consume 75% or more of meals during LOS    · Monitoring: Meal Intake, Supplement Intake, Diet Tolerance, Skin Integrity, Wound Healing, Weight, Comparative Standards, Pertinent Labs    See Adult Nutrition Doc Flowsheet for more detail.      Electronically signed by Anahy Miramontes RD, RAISA on 12/8/17 at 3:18 PM    Contact Number: [phone]

## 2017-12-08 NOTE — PROGRESS NOTES
A  White 32 yr. Old male admitted t [id number] per w/c. Refer to admit form. Admitted to the Inpatient Rehabilitation Unit via w/c to room and unit. Education provided on the rehabilitation routine: three hours of therapy five days per week and dining room for lunch. Care plan was created with patient's input and goals were agreed upon. Admission folder provided with education regarding patients diagnoses, fall prevention, skin care, and M in the box. \"Data Collection Information Summary for Patients in Inpatient Rehabilitation Facilities\" and \"Privacy Act Statement - Health Care Records\" provided. Please refer to the admission navigator for further information.

## 2017-12-08 NOTE — PLAN OF CARE
given mod cues to improve complex organization and planning. Goal 4: Pt will complete complex naming, verbal description and written expressive tasks with 80% accuracy, mod cues for maximized expression of complex wants/needs. Goal 5: Pt will follow multi step and complex level commands with 80% accuracy, min cues for improved working retention and functional direction following within therapeutic sessions. Timeframe for Short-term Goals: 1 week     Plan of Care: Pt to be seen by speech therapy services 30 Minutes per day at least 5 out of 7 days per week for a total of 6 days during estimated length of stay    Anticipated interventions may include speech/language/communication therapy, cognitive training, group therapy, education, and/or dysphagia therapy based on the above goals. CASE MANAGEMENT:  Goals:   Assist patient/family with discharge planning, patient/family counseling,  and coordination with insurance during the inpatient rehabilitation stay. Other members of the multidisciplinary rehabilitation team that will be involved in the patient's plan of care include recreational therapy, dietary, respiratory therapy, and neuropsychology. Medical issues being managed closely and that require 24 hour availability of a physician:  Wound care, Infection protection, DVT prophylaxis and Fall precautions                                           Physician anticipated functional outcomes: Improved independence with functional measures   Estimated length of stay for this admission 10 days  Medical Prognosis: Good  Anticipated disposition: Outpatient Therapy. The potential to achieve the above medical and rehabilitative goals is very good. This plan of care has been developed with the assistance and input of the multidisciplinary rehabilitation team.  The plan was reviewed with the patient.   The patient has had the opportunity to provide input to the therapy team.    I have reviewed this
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
## 2017-12-08 NOTE — PROGRESS NOTES
RN called [place] regarding discharge order and to call report. Charge nurse stated that RN was at lunch but will call back for report. Patient and family updated on discharge/acceptance to IP Rehab.
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
## 2017-12-08 NOTE — PROGRESS NOTES
4/5 independently, 1/5 utilizing list independently  -10 Minute Recall: 4/5 independently, 1/5 utilizing list independently    Hortencia You7 #5:  Goal 5: Pt will complete mental math computation tasks with 60% accuracy given mod cues to improve attention and numerical reasoning. INTERVENTIONS:Pt prompted to complete verbal math story problems. Pt completed this task 4/5 independently, 1/5 with max cues. Improvements in processing speed this session. ASSESSMENT:  Assessment: [x]Progressing towards goals          []Not Progressing towards goals       Patient Tolerance of Treatment:   [x]Tolerated well []Tolerated fair []Required rest breaks []Fatigued  Plan for Next Session: executive fucntioning  Education:  Learner:  [x]Patient          [x]Significant Other          []Other  Education provided regarding:  [x]Goals and POC   []Diet and swallowing precautions    [x]Home Exercise Program  [x]Progress and/or discharge information  Method of Education:  [x]Discussion          [x]Demonstration          []Handout          []Other  Evaluation of Education:   [x]Verbalized understanding   []Demonstrates without assistance  [x]Demonstrates with assistance  [x]Needs further instruction     []No evidence of learning                  []No family present      Plan: [x]Continue with current plan of care    []Modify current plan of care as follows:    []Discharge patient    Discharge Location:    Services/Supervision Recommended:     [x]Patient continues to require treatment by a licensed therapist to address functional deficits as outlined in the established plan of care.     PUSHPA Sol., Speech Intern  Kathy Edward M.S. JERARDO-NAS/[id number]

## 2017-12-08 NOTE — PROGRESS NOTES
Nerves:  cranial nerves II-XII are grossly intact  ROM:  normal  Motor Exam:  Motor exam is symmetrical 4 out of 5 all extremities bilaterally     Tone:  normal  Muscle bulk: within normal limits  Sensory:  Sensory intact  Coordination:   abnormal - unable to do BILATERAL HTS accurately, FTN and CINDA intact  Deep Tendon Reflexes:  Reflexes are intact and symmetrical bilaterally     Skin: warm and dry, no rash or erythema  Peripheral vascular: Pulses: Normal upper and lower extremity pulses; Edema: 1+    Diagnostics:   Recent Results (from the past 24 hour(s))   Basic Metabolic Panel    Collection Time: 12/07/17  5:32 AM   Result Value Ref Range    Sodium 141 135 - 145 meq/L    Potassium 4.0 3.5 - 5.2 meq/L    Chloride 101 98 - 111 meq/L    CO2 28 23 - 33 meq/L    Glucose 105 70 - 108 mg/dL    BUN 14 7 - 22 mg/dL    CREATININE 0.6 0.4 - 1.2 mg/dL    Calcium 9.7 8.5 - 10.5 mg/dL   CBC    Collection Time: 12/07/17  5:32 AM   Result Value Ref Range    WBC 5.4 4.8 - 10.8 thou/mm3    RBC 5.75 4.70 - 6.10 mill/mm3    Hemoglobin 16.9 14.0 - 18.0 gm/dl    Hematocrit 49.4 42.0 - 52.0 %    MCV 85.8 80.0 - 94.0 fL    MCH 29.4 27.0 - 31.0 pg    MCHC 34.2 33.0 - 37.0 gm/dl    RDW 13.0 11.5 - 14.5 %    Platelets 114 111 - 504 thou/mm3    MPV 8.5 7.4 - 10.4 mcm   Anion Gap    Collection Time: 12/07/17  5:32 AM   Result Value Ref Range    Anion Gap 12.0 8.0 - 16.0 meq/L   Glomerular Filtration Rate, Estimated    Collection Time: 12/07/17  5:32 AM   Result Value Ref Range    Est, Glom Filt Rate >90 ml/min/1.73m2   T3, free    Collection Time: 12/07/17  1:27 PM   Result Value Ref Range    T3, Free 8.74 (H) 2.02 - 4.43 pg/mL     Impression:  1. 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 2. Cognitive deficits. 3. Gait instability.   4. Acute respiratory failure requiring

## 2017-12-08 NOTE — PROGRESS NOTES
[facility]  Individualized Disclosure Statement      Patient: Kala Jones    Scope of Service  [facility] provides 24 hour individualized service to patients with functional limitations due to, but not limited to: stroke, brain injury, spinal cord injury, major multiple trauma, fractures, amputation, and neurological disorders. The [address] [place] [address] provides rehabilitative nursing and medical services as well as physical, occupational, speech, and recreation therapies. [facility] is fully accredited by the Commission on Accreditation of Rehabilitation Facilities (CARF) as a comprehensive provider of rehabilitation services. Patients admitted to the [address] [place] [address] receive a minimum of three hours of therapy per day, at least six days per week, with a revised therapy schedule on weekends and holidays. Physical therapy, occupational therapy, and speech therapy are provided seven days per week including holidays. Other therapeutic services are available on weekends and evenings as needed or scheduled. Intensity of Treatment  Your treatment program will consist of Nursing Care and:  1 hours of Physical Therapy, per day  1    hours of Occupational Therapy, per day  1    hours of Speech Therapy, per day  1 hours of Recreational Therapy, per week    [address] [address] maintains contracts with most insurance plans. Depending on the type of coverage, the insurance may impose limits on the coverage for rehabilitation care. Coverage is based on the premise that you are able to fully participate in the rehabilitation program and show continued progress.  Please verify your own insurance information   Insurance Coverage  Your insurance company has made the following determination relative to the length of your stay:   Your estimated length of stay is 14 days   Your insurance Coverage

## 2017-12-09 ENCOUNTER — APPOINTMENT (OUTPATIENT)
Dept: ULTRASOUND IMAGING | Age: 32
DRG: 949 | End: 2017-12-09
Attending: PHYSICAL MEDICINE & REHABILITATION
Payer: COMMERCIAL

## 2017-12-09 LAB
ALBUMIN SERPL-MCNC: 3.8 G/DL (ref 3.5–5.1)
ALP BLD-CCNC: 75 U/L (ref 38–126)
ALT SERPL-CCNC: 121 U/L (ref 11–66)
ANION GAP SERPL CALCULATED.3IONS-SCNC: 12 MEQ/L (ref 8–16)
AST SERPL-CCNC: 50 U/L (ref 5–40)
BILIRUB SERPL-MCNC: 0.9 MG/DL (ref 0.3–1.2)
BUN BLDV-MCNC: 15 MG/DL (ref 7–22)
CALCIUM SERPL-MCNC: 9.8 MG/DL (ref 8.5–10.5)
CHLORIDE BLD-SCNC: 98 MEQ/L (ref 98–111)
CO2: 29 MEQ/L (ref 23–33)
CREAT SERPL-MCNC: 0.5 MG/DL (ref 0.4–1.2)
GFR SERPL CREATININE-BSD FRML MDRD: > 90 ML/MIN/1.73M2
GLUCOSE BLD-MCNC: 102 MG/DL (ref 70–108)
HAV IGM SER IA-ACNC: NEGATIVE
HCT VFR BLD CALC: 47.6 % (ref 42–52)
HEMOGLOBIN: 16.2 GM/DL (ref 14–18)
HEPATITIS B CORE IGM ANTIBODY: NEGATIVE
HEPATITIS B SURFACE ANTIGEN: NEGATIVE
HEPATITIS C ANTIBODY: NEGATIVE
POTASSIUM SERPL-SCNC: 4.4 MEQ/L (ref 3.5–5.2)
SEDIMENTATION RATE, ERYTHROCYTE: 14 MM/HR (ref 0–10)
SODIUM BLD-SCNC: 139 MEQ/L (ref 135–145)
TOTAL PROTEIN: 6.5 G/DL (ref 6.1–8)
VITAMIN D 25-HYDROXY: 18 NG/ML (ref 30–100)

## 2017-12-09 PROCEDURE — 97110 THERAPEUTIC EXERCISES: CPT

## 2017-12-09 PROCEDURE — 85018 HEMOGLOBIN: CPT

## 2017-12-09 PROCEDURE — 85014 HEMATOCRIT: CPT

## 2017-12-09 PROCEDURE — 2580000003 HC RX 258: Performed by: PHYSICIAN ASSISTANT

## 2017-12-09 PROCEDURE — 80074 ACUTE HEPATITIS PANEL: CPT

## 2017-12-09 PROCEDURE — 36415 COLL VENOUS BLD VENIPUNCTURE: CPT

## 2017-12-09 PROCEDURE — 6370000000 HC RX 637 (ALT 250 FOR IP): Performed by: PHYSICAL MEDICINE & REHABILITATION

## 2017-12-09 PROCEDURE — 80053 COMPREHEN METABOLIC PANEL: CPT

## 2017-12-09 PROCEDURE — 82306 VITAMIN D 25 HYDROXY: CPT

## 2017-12-09 PROCEDURE — 97530 THERAPEUTIC ACTIVITIES: CPT

## 2017-12-09 PROCEDURE — 97162 PT EVAL MOD COMPLEX 30 MIN: CPT

## 2017-12-09 PROCEDURE — A6198 ALGINATE DRESSING > 48 SQ IN: HCPCS

## 2017-12-09 PROCEDURE — 76705 ECHO EXAM OF ABDOMEN: CPT

## 2017-12-09 PROCEDURE — 6370000000 HC RX 637 (ALT 250 FOR IP): Performed by: PHYSICIAN ASSISTANT

## 2017-12-09 PROCEDURE — 97166 OT EVAL MOD COMPLEX 45 MIN: CPT

## 2017-12-09 PROCEDURE — 85651 RBC SED RATE NONAUTOMATED: CPT

## 2017-12-09 PROCEDURE — 97116 GAIT TRAINING THERAPY: CPT

## 2017-12-09 PROCEDURE — 97535 SELF CARE MNGMENT TRAINING: CPT

## 2017-12-09 PROCEDURE — 1180000000 HC REHAB R&B

## 2017-12-09 PROCEDURE — 96125 COGNITIVE TEST BY HC PRO: CPT

## 2017-12-09 PROCEDURE — 6370000000 HC RX 637 (ALT 250 FOR IP): Performed by: INTERNAL MEDICINE

## 2017-12-09 RX ORDER — METHIMAZOLE 10 MG/1
10 TABLET ORAL DAILY
Status: DISCONTINUED | OUTPATIENT
Start: 2017-12-09 | End: 2017-12-11

## 2017-12-09 RX ORDER — SENNA PLUS 8.6 MG/1
2 TABLET ORAL NIGHTLY
Status: DISCONTINUED | OUTPATIENT
Start: 2017-12-09 | End: 2017-12-15 | Stop reason: HOSPADM

## 2017-12-09 RX ADMIN — Medication 10 ML: at 21:40

## 2017-12-09 RX ADMIN — METOPROLOL TARTRATE 25 MG: 25 TABLET ORAL at 21:38

## 2017-12-09 RX ADMIN — METHIMAZOLE 10 MG: 10 TABLET ORAL at 21:38

## 2017-12-09 RX ADMIN — FAMOTIDINE 20 MG: 20 TABLET, FILM COATED ORAL at 21:38

## 2017-12-09 RX ADMIN — RIVAROXABAN 15 MG: 15 TABLET, FILM COATED ORAL at 09:02

## 2017-12-09 RX ADMIN — BACITRACIN 1 G: 500 OINTMENT TOPICAL at 21:38

## 2017-12-09 RX ADMIN — BACITRACIN 1 G: 500 OINTMENT TOPICAL at 09:01

## 2017-12-09 RX ADMIN — FAMOTIDINE 20 MG: 20 TABLET, FILM COATED ORAL at 09:02

## 2017-12-09 RX ADMIN — VITAMIN D, TAB 1000IU (100/BT) 5000 UNITS: 25 TAB at 17:58

## 2017-12-09 RX ADMIN — Medication 10 ML: at 09:11

## 2017-12-09 RX ADMIN — SENNA 17.2 MG: 8.6 TABLET, COATED ORAL at 21:38

## 2017-12-09 RX ADMIN — RIVAROXABAN 15 MG: 15 TABLET, FILM COATED ORAL at 17:57

## 2017-12-09 RX ADMIN — METOPROLOL TARTRATE 25 MG: 25 TABLET ORAL at 09:02

## 2017-12-09 ASSESSMENT — PAIN SCALES - GENERAL
PAINLEVEL_OUTOF10: 0
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
## 2017-12-09 NOTE — PROGRESS NOTES
surgical history on file. Subjective  Chart Reviewed: Yes (H&P, orders, images, therapy notes )    Subjective: Pt up [place], girlfriend present.  Pt pleasant and eager to work with therapy, pt can be impulsive     General:  Overall Orientation Status: Within Functional Limits    Vision: Within Functional Limits    Hearing: Within functional limits         Pain:      denies     Social/Functional:  Lives With: Significant other (girlfriend, 3 kids, 15, 3, 1.5)  Type of Home: House  Home Layout: One level  Home Access: Stairs to enter without rails  Entrance Stairs - Number of Steps: 1     Bathroom Shower/Tub: Tub/Shower unit  Bathroom Toilet: Standard (pt thinks toielt might be a little higher then standard )       ADL Assistance: Independent  Homemaking Assistance: Independent       Ambulation Assistance: Independent  Transfer Assistance: Independent    Active : Yes  Mode of Transportation: Car  Occupation: Full time employment  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 Pt able to attend to task well this date needing only 2 vc. decreased insight and safety awareness, pt can be impulsive, much improvement noted with vc         Sensation  Overall Sensation Status: WFL (pt reports)                           LUE AROM (degrees)  LUE AROM : WFL  LUE General AROM: increased difficulty with shoulder flexion, however able to achieve [facility], pt reports history of arthritis           RUE AROM (degrees)  RUE AROM : WFL  RUE General AROM: increased difficulty with shoulder flexion, however able to achieve [facility], pt reports history of arthritis        LUE Strength  Gross LUE Strength:  (4/5 grossly )  LUE Strength Comment: BUE genereal deconditioning with pt fatiguing quickly during exercises this date                 RUE Strength  Gross RUE Strength:  (4/5 grossly)                     RUE Tone: Normotonic  LUE Tone: Normotonic    Movements Are Fluid And Coordinated: Yes                          Transfers  Sit to stand: Contact guard assistance (vc for hand placement, good follow through, from recliner w/c, shower bench )  Stand to sit: Minimal assistance (X 1 event when pt backed up to quickly to chair with slight posterior lean. Pt CGA with vc for safe technique including to line up with destination X 4 events )    Balance  Sitting Balance: Stand by assistance (on shower bench, good midline oientation noted this date, no LOB)  Standing Balance: Contact guard assistance (X2 minutes in shower, 3 minutes at sink for oral care, no LOB with 1 UE support)     Time: 2 minutes, 3 minutes,   Activity: ADLS     Functional Mobility  Functional - Mobility Device: Rolling Walker  Activity: To/from bathroom, Other  Assist Level: Contact guard assistance  Functional Mobility Comments: Pt demonstrated functional mobility to/from bathroom, in shower room, and from recliner to w/c X 10 feet X 2 events. pt slightly unsteady when turning with walker, required physical assistance and constant cues for walker safety. pt with narrow PIPPA, cues needed with pt able to correct briefly. no LOB      Type of ROM/Therapeutic Exercise  Type of ROM/Therapeutic Exercise: Free weights  Comment: pt completed BUE strenthening exercises with 3# free weight for chest press and shoulder flexion X 15 reps. no vc needed to attend to task, rest breaks needed. Eatin - Patient feeds self   Groomin - Able to perform 3-4 tasks with touching help (CGA to stand at sink to complete oral care)   Bathin - Able to bathe 8-9 areas (CGA for balance when standing to wash bottom.  vc needed for safety d/t pt being impulsive, additional vc for completeness of task )   Dressing-Upper: 5 - Requires setup/supervision/cues and/or requires assist with presthesis/brace only (to don shirt seated, Devices in place: Yes  Type of devices: All fall risk precautions in place, Gait belt, Call light within reach, Chair alarm in place, Left in chair (s/u for breakfast at end of session)    Plan:  Times per week: 5X week for 90 minutes, 1 X week for 30 minutes   Current Treatment Recommendations: Strengthening, Endurance Training, Patient/Caregiver Education & Training, Equipment Evaluation, Education, & procurement, Self-Care / ADL, Cognitive Reorientation, Balance Training, Home Management Training, Cognitive/Perceptual Training, Functional Mobility Training, Safety Education & Training    Goals:  Patient goals : \" I want to be able to walk and move better\"     Short term goals  Time Frame for Short term goals: 1 week  Short term goal 1: Pt will complete BADL routine with CGA and no > 1 vc for completeness of task or proper sequencing to increase safety and indep with self cares at home  Short term goal 2: pt will follow simple 2-3 steps commands with 100% accuracy to increase independence with return to work tasks   Short term goal 3: Pt to complete functional mobility using AD to/from bathroom with CGA and no vc for walker safety to increase safety and Indep. with toileting   Short term goal 4: Pt to complete transfers from various surfaces including toilet with consistant CGA, good balance and no vcs for safety to increase indep with toileting routine   Short term goal 5: Pt will complete dynamic standing tasks with CGA and 1-2 hand release for > 5 minutes with no LOB or vc for completeness of task to increase indep. with sink side grooming. Long term goals  Time Frame for Long term goals : 2-3 weeks  Long term goal 1: Pt will complete homemaking task independently with no vc for safety or completeness of task for increased safety with IADLs at home  Long term goal 2: Pt will complete shower routine Mod I including tub transfer wtih no vc for safety or completeness to increase I wtih self cares.      Evaluation Complexity: Based on the findings of patient history, examination, clinical presentation, and decision making during this evaluation, this patient is of medium complexity.

## 2017-12-09 NOTE — PROGRESS NOTES
names/regimen, visual recall and appointment details), written expression (spelling errors, high level syntax structure), basic attention (i.e. Selective visual attention) and executive functioning skills (i.e. Clock drawing). Pt demonstrating mildly impaired high level expressive/receptive language deficits evidenced by decreased direction following of complex level/multi step commands, abstract divergent naming and impaired verbal picture description. No overt dysphagia/dysarthria. Would greatly benefit from continued ST services in order to progress skills towards baseline level function for functional return to work/management of IADL management upon discharge. FIMS: Communication  Comprehension: 4 - Patient understands basic needs 75-90%+ of the time  Expression: 5 - Expresses basic ideas/needs only (hungry/hot/pain)  Social Cognition  Social Interaction: 4 - Patient appropriate 75-90%+ of the time  Problem Solvin - Patient solves simple/routine tasks 75-90%+   Memory: 3 - Patient remembers 50%-74% of the time        REHAB POTENTIAL: [x] Excellent [] Good [] Fair  [] Poor    EDUCATION:   Learner: [x]Patient [x] Significant other [] Son/Daughter [] Parent     [] Other:   Education: [x] Reviewed results and recommendations of this evaluation     [] Reviewed diet and strategies     [] Reviewed signs, symptoms and risk of aspiration     [] Demonstrated how to thick liquid appropriately. [x] Reviewed goals and Plan of Care     [] OTHER:   Method: [x] Discussion [] Demonstration [] Hand-out     [] OTHER:   Evaluation of Education:     [x] Verbalizes understanding [x] Demonstrates with assistance     [] Demonstrates without assistance [x]Needs further instruction     [] No evidence of learning  [] Family not present    PLAN / TREATMENT RECOMMENDATIONS:  [x] Skilled SLP intervention on IP Rehab or TCU 30 minutes per day 5 days per week.   Specific interventions for next session may include: cognitive tasks (see goals)       SHORT TERM GOALS:  Short-term Goals  Timeframe for Short-term Goals: 1 week   Goal 1: Pt will complete functional carryover, delayed recall and working recall tasks with 70% accuracy, mod cues for improved retention of newly learned medical information. Goal 2: Pt will complete high level selective, alternating and divided attention tasks with no more than x2-3 errors/redirections within a 2 minute task to improve basic attention for return to management of IADL functions. Goal 3: Pt will complete high level organization, reasoning and executive functioning tasks with 75% accuracy given mod cues to improve complex organization and planning. Goal 4: Pt will complete complex naming, verbal description and written expressive tasks with 80% accuracy, mod cues for maximized expression of complex wants/needs. Goal 5: Pt will follow multi step and complex level commands with 80% accuracy, min cues for improved working retention and functional direction following within therapeutic sessions. LONG TERM GOALS:  Long-term Goals  Timeframe for Long-term Goals: 2 weeks   Goal 1: Pt will improve cognitive-linguistic skills to a baseline level function for functional return to management of IADL tasks and return to employment setting upon discharge.        Katja Joaquin M.S. Arnel Ny 12/9/2017

## 2017-12-10 LAB
ALBUMIN SERPL-MCNC: 3.9 G/DL (ref 3.5–5.1)
ALP BLD-CCNC: 81 U/L (ref 38–126)
ALT SERPL-CCNC: 124 U/L (ref 11–66)
AST SERPL-CCNC: 43 U/L (ref 5–40)
BILIRUB SERPL-MCNC: 0.8 MG/DL (ref 0.3–1.2)
BILIRUBIN DIRECT: 0.3 MG/DL (ref 0–0.3)
TOTAL PROTEIN: 6.7 G/DL (ref 6.1–8)

## 2017-12-10 PROCEDURE — 6370000000 HC RX 637 (ALT 250 FOR IP): Performed by: PHYSICAL MEDICINE & REHABILITATION

## 2017-12-10 PROCEDURE — 1180000000 HC REHAB R&B

## 2017-12-10 PROCEDURE — 6360000002 HC RX W HCPCS: Performed by: PHYSICAL MEDICINE & REHABILITATION

## 2017-12-10 PROCEDURE — 6370000000 HC RX 637 (ALT 250 FOR IP): Performed by: INTERNAL MEDICINE

## 2017-12-10 PROCEDURE — 80076 HEPATIC FUNCTION PANEL: CPT

## 2017-12-10 PROCEDURE — 36415 COLL VENOUS BLD VENIPUNCTURE: CPT

## 2017-12-10 PROCEDURE — 2580000003 HC RX 258: Performed by: PHYSICIAN ASSISTANT

## 2017-12-10 PROCEDURE — 6370000000 HC RX 637 (ALT 250 FOR IP): Performed by: PHYSICIAN ASSISTANT

## 2017-12-10 RX ORDER — ONDANSETRON 4 MG/1
4 TABLET, ORALLY DISINTEGRATING ORAL EVERY 8 HOURS PRN
Status: DISCONTINUED | OUTPATIENT
Start: 2017-12-10 | End: 2017-12-15 | Stop reason: HOSPADM

## 2017-12-10 RX ADMIN — METHIMAZOLE 10 MG: 10 TABLET ORAL at 09:35

## 2017-12-10 RX ADMIN — FAMOTIDINE 20 MG: 20 TABLET, FILM COATED ORAL at 22:09

## 2017-12-10 RX ADMIN — METOPROLOL TARTRATE 25 MG: 25 TABLET ORAL at 09:35

## 2017-12-10 RX ADMIN — Medication 10 ML: at 09:37

## 2017-12-10 RX ADMIN — BACITRACIN 1 G: 500 OINTMENT TOPICAL at 22:10

## 2017-12-10 RX ADMIN — VITAMIN D, TAB 1000IU (100/BT) 5000 UNITS: 25 TAB at 09:35

## 2017-12-10 RX ADMIN — RIVAROXABAN 20 MG: 15 TABLET, FILM COATED ORAL at 09:35

## 2017-12-10 RX ADMIN — BACITRACIN 1 G: 500 OINTMENT TOPICAL at 09:37

## 2017-12-10 RX ADMIN — Medication 10 ML: at 22:10

## 2017-12-10 RX ADMIN — METOPROLOL TARTRATE 25 MG: 25 TABLET ORAL at 22:09

## 2017-12-10 RX ADMIN — RIVAROXABAN 15 MG: 15 TABLET, FILM COATED ORAL at 09:38

## 2017-12-10 RX ADMIN — ONDANSETRON 4 MG: 4 TABLET, ORALLY DISINTEGRATING ORAL at 00:23

## 2017-12-10 RX ADMIN — SENNA 17.2 MG: 8.6 TABLET, COATED ORAL at 22:09

## 2017-12-10 RX ADMIN — RIVAROXABAN 15 MG: 15 TABLET, FILM COATED ORAL at 17:33

## 2017-12-10 RX ADMIN — FAMOTIDINE 20 MG: 20 TABLET, FILM COATED ORAL at 09:35

## 2017-12-10 ASSESSMENT — PAIN SCALES - GENERAL
PAINLEVEL_OUTOF10: 0
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
## 2017-12-10 NOTE — PROGRESS NOTES
Pt sitting up in chair. Family at bedside. Fresh ice water given. Pt denies any other needs at present.

## 2017-12-10 NOTE — PLAN OF CARE
Problem: Falls - Risk of  Goal: Absence of falls  Outcome: Ongoing  No falls this shift. Patient uses call light when needing assistance. Falling star program in place. Bed and chair alarms used at all times. Will continue to monitor and perform hourly rounding. Problem: Safety:  Goal: Adaptations to environment to decrease the risk of trauma will improve  Adaptations to environment to decrease the risk of trauma will improve   Outcome: Ongoing  Bed in lowest position, items within reach, paths clear, bed and chair alarms on at all times. Will monitor    Problem: Bleeding:  Goal: Will show no signs and symptoms of excessive bleeding  Will show no signs and symptoms of excessive bleeding   Outcome: Ongoing  No signs or symptoms of bleeding observed by this nurse or indicated via labs. DVT prophylaxis     Problem: Nutritional:  Goal: Nutritional status will improve  Nutritional status will improve   Outcome: Ongoing  Monitoring intake    Problem: Skin Integrity:  Goal: Demonstration of wound healing without infection will improve  Demonstration of wound healing without infection will improve   Outcome: Ongoing  No skin breakdown during hospitalization. Problem: Nutrition  Goal: Optimal nutrition therapy  Outcome: Ongoing  Monitoring intake    Comments: Care plan reviewed with patient. Patient  verbalize understanding of the plan of care and contribute to goal setting.

## 2017-12-10 NOTE — PROGRESS NOTES
75-90%+   Memory: 3 - Patient remembers 50%-74% of the time    IMPRESSIONS: Pt presents with at least mild cognitive impairments evidenced by derived SCCAN raw score of 79/94. Cognitive deficits found in the areas of basic orientation, memory (functional retention of daily medication names/regimen, visual recall and appointment details), written expression (spelling errors, high level syntax structure), basic attention (i.e. Selective visual attention) and executive functioning skills (i.e. Clock drawing). Pt demonstrating mildly impaired high level expressive/receptive language deficits evidenced by decreased direction following of complex level/multi step commands, abstract divergent naming and impaired verbal picture description. No overt dysphagia/dysarthria. Would greatly benefit from continued ST services in order to progress skills towards baseline level function for functional return to work/management of IADL management upon discharge.      Review of Systems:  CONSTITUTIONAL:  positive for  fatigue, weight loss and hot flashes  EYES:  negative  HEENT:  negative  RESPIRATORY:  negative  CARDIOVASCULAR:  negative  GASTROINTESTINAL:  negative  GENITOURINARY:  negative  SKIN:  negative  HEMATOLOGIC/LYMPHATIC:  negative  MUSCULOSKELETAL:  positive for  muscle weakness  NEUROLOGICAL:  positive for memory problems, gait problems, dizziness, and weakness  BEHAVIOR/PSYCH:  positive for confusion  10 point system review otherwise negative    Objective:  /77   Pulse 101   Temp 97.3 °F (36.3 °C) (Oral)   Resp 18   Ht 6' 3\" (1.905 m)   Wt 268 lb 6.4 oz (121.7 kg)   SpO2 96%   BMI 33.55 kg/m²     awake  Orientation:   person, place, time, situation  Mood: dysthymic  Affect: flat  General appearance:  in no acute distress, up on EOB     Memory:   abnormal - impaired TBI  Attention/Concentration: abnormal - able to follow single step commands with mild delay; unable to complete two step commands  Language:  normal     Cranial Nerves:  cranial nerves II-XII are grossly intact  ROM:  normal  Motor Exam:  Motor exam is symmetrical 4 out of 5 all extremities bilaterally     Tone:  normal  Muscle bulk: within normal limits  Sensory:  Sensory intact  Coordination:   abnormal - unable to do BILATERAL HTS accurately, FTN and CINDA intact  Deep Tendon Reflexes:  Reflexes are intact and symmetrical bilaterally     Skin: warm and dry, no rash or erythema, laceration at lateral RIGHT eyebrow  Peripheral vascular: Pulses: Normal upper and lower extremity pulses; Edema: 1+    Diagnostics:   Recent Results (from the past 24 hour(s))   Hemoglobin and Hematocrit, Blood    Collection Time: 12/09/17  5:38 AM   Result Value Ref Range    Hemoglobin 16.2 14.0 - 18.0 gm/dl    Hematocrit 47.6 42.0 - 52.0 %   Comprehensive Metabolic Panel    Collection Time: 12/09/17  5:38 AM   Result Value Ref Range    Glucose 102 70 - 108 mg/dL    CREATININE 0.5 0.4 - 1.2 mg/dL    BUN 15 7 - 22 mg/dL    Sodium 139 135 - 145 meq/L    Potassium 4.4 3.5 - 5.2 meq/L    Chloride 98 98 - 111 meq/L    CO2 29 23 - 33 meq/L    Calcium 9.8 8.5 - 10.5 mg/dL    AST 50 (H) 5 - 40 U/L    Alkaline Phosphatase 75 38 - 126 U/L    Total Protein 6.5 6.1 - 8.0 g/dL    Alb 3.8 3.5 - 5.1 g/dL    Total Bilirubin 0.9 0.3 - 1.2 mg/dL     (H) 11 - 66 U/L   Vitamin D 25 Hydroxy    Collection Time: 12/09/17  5:38 AM   Result Value Ref Range    Vit D, 25-Hydroxy 18 (L) 30 - 100 ng/ml   Anion Gap    Collection Time: 12/09/17  5:38 AM   Result Value Ref Range    Anion Gap 12.0 8.0 - 16.0 meq/L   Glomerular Filtration Rate, Estimated    Collection Time: 12/09/17  5:38 AM   Result Value Ref Range    Est, Glom Filt Rate >90 ml/min/1.73m2   Hepatitis Panel, Acute    Collection Time: 12/09/17  5:49 PM   Result Value Ref Range    Hepatitis B Surface Ag Negative     Hep A IgM Negative     Hep B Core Ab, IgM Negative     Hepatitis C Ab Negative      Impression:  1.  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 2. Mild cognitive impairments SCCAN) completed. Patient scored 79/94. Normal is greater than or equal to 87/94.  3.    Gait instability. 4. Acute respiratory failure requiring intubation. 5. Tachycardia. 6. Multinodular goiter. 7. Pulmonary embolism in medial LEFT lower lobe basilar segmental pulmonary emboli. 8. Incidental 7 x 7 mm RIGHT upper lobe pulmonary nodule. 9. RIGHT ear laceration. 10. HTN. 11. Transaminitis. 12. Nicotine abuse. 13. Hypovitaminosis D, Vitamin 25OH level of 18 on 12/9/2017.     Plan:      Medical management: No provider consulted at this time. .     Consultants: Otolaryngology, Trauma Surgery, Neurosurgery, Critical Care, Plastic Surgery, Internal Medicine, Endocrinology, Physical Medicine     Acute/Rehabilitation Problems:  1. TBI with diffuse axonal injury. 1. TBI protocol initiated. 2. Bright light therapy ordered. 3. Family requesting to act as his sitter. 4. Allowed 1 hour of TV starting 12/9 after therapies. 2. Mild cognitive impairments. 1. SLP. 3. Gait instability. 1. PT/OT. 1. Ambulated 61' at [facility] with RW and also did 10' ramp and 4 6\" steps in the parallel bars. 4. Tachycardia. 1. Metoprolol. 2. Likely caused by thyroid abnormality. 3. 24 hour range .  5. Elevated thyroid hormones with appropriately decreased TSH. 1. T3, Free 8.74, T4, Free 2.77, TSH <0.005.  2. U/S of thyroid ordered after getting to . 1. Multinodular goiter with nodules in both lobes and isthmus. 2. Likely cause of tachycardia and hypertension and hot flashes and weightloss during this admission. 3. Results of U/S discussed with patient and sister on 12/8.  4. Consult placed to Endocrinology. Appreciate assistance. 1. Dr. Nicci Loepz saw patient on 12/9.  1. Consult note pending.   2. Methimazole 10mg daily started

## 2017-12-10 NOTE — PROGRESS NOTES
Patient started on Tapazole. Patient educated on medication and side effects; changing color of urine, causing nausea, and effecting liver. Patient assisted to bathroom after taking medication and c/o urine already turning darker. Urine noted to be yellow. Patient then c/o nausea. Dr. Venessa Navarro notified and N.O. For Zofran received and given after saltines and 7 up in effective for nausea. Patient continues to have difficulty with word find and expressing himself. Patient noted to demonstrate some paranoid behavior re Tapazole. Will continue to monitor and assist. NO family at bed side this shift.

## 2017-12-10 NOTE — PLAN OF CARE
Problem: Falls - Risk of  Goal: Absence of falls  Patient remains free from falls this shift. Call light in place. Fall risk band in place. Problem: Safety:  Goal: Adaptations to environment to decrease the risk of trauma will improve  Adaptations to environment to decrease the risk of trauma will improve   Patient Alert and oriented x4. Arm bands in place for saftey    Problem: Bleeding:  Goal: Will show no signs and symptoms of excessive bleeding  Will show no signs and symptoms of excessive bleeding   No sign of bleeding at this time    Problem: Nutritional:  Goal: Nutritional status will improve  Nutritional status will improve   Dietician evaluated patient needs. Patient continues to eat all or most of each meal    Problem: Physical Regulation:  Goal: Will remain free from infection  Will remain free from infection   No signs or symptoms of infection antibiotic therapy continues    Problem: Skin Integrity:  Goal: Demonstration of wound healing without infection will improve  Demonstration of wound healing without infection will improve   No signs or symptoms of infection antibiotic therapy continues    Problem: Nutrition  Goal: Optimal nutrition therapy  Dietician evaluated patient needs. Patient continues to eat all or most of each meal    Comments: Care Plan reviewed with patient. Patient verbalizes understanding to educator and is willing to contribute and participate in goal planning.

## 2017-12-11 LAB
ALBUMIN SERPL-MCNC: 4.3 G/DL (ref 3.5–5.1)
ALP BLD-CCNC: 97 U/L (ref 38–126)
ALT SERPL-CCNC: 130 U/L (ref 11–66)
AST SERPL-CCNC: 37 U/L (ref 5–40)
BILIRUB SERPL-MCNC: 0.7 MG/DL (ref 0.3–1.2)
BILIRUBIN DIRECT: 0.3 MG/DL (ref 0–0.3)
THYROID STIMULATING IMMUNOGLOBULIN: 175 %
TOTAL PROTEIN: 7.8 G/DL (ref 6.1–8)

## 2017-12-11 PROCEDURE — 97116 GAIT TRAINING THERAPY: CPT

## 2017-12-11 PROCEDURE — 36415 COLL VENOUS BLD VENIPUNCTURE: CPT

## 2017-12-11 PROCEDURE — 97110 THERAPEUTIC EXERCISES: CPT

## 2017-12-11 PROCEDURE — 1180000000 HC REHAB R&B

## 2017-12-11 PROCEDURE — 6370000000 HC RX 637 (ALT 250 FOR IP): Performed by: PHYSICIAN ASSISTANT

## 2017-12-11 PROCEDURE — 80076 HEPATIC FUNCTION PANEL: CPT

## 2017-12-11 PROCEDURE — 97530 THERAPEUTIC ACTIVITIES: CPT

## 2017-12-11 PROCEDURE — 6370000000 HC RX 637 (ALT 250 FOR IP): Performed by: INTERNAL MEDICINE

## 2017-12-11 PROCEDURE — 99232 SBSQ HOSP IP/OBS MODERATE 35: CPT | Performed by: NURSE PRACTITIONER

## 2017-12-11 PROCEDURE — 97532 HC COGNITIVE THERAPY 15 MIN: CPT

## 2017-12-11 PROCEDURE — 97535 SELF CARE MNGMENT TRAINING: CPT

## 2017-12-11 PROCEDURE — 6370000000 HC RX 637 (ALT 250 FOR IP): Performed by: PHYSICAL MEDICINE & REHABILITATION

## 2017-12-11 RX ADMIN — FAMOTIDINE 20 MG: 20 TABLET, FILM COATED ORAL at 07:59

## 2017-12-11 RX ADMIN — RIVAROXABAN 15 MG: 15 TABLET, FILM COATED ORAL at 07:59

## 2017-12-11 RX ADMIN — BACITRACIN 1 G: 500 OINTMENT TOPICAL at 08:00

## 2017-12-11 RX ADMIN — METHIMAZOLE 10 MG: 10 TABLET ORAL at 07:59

## 2017-12-11 RX ADMIN — METOPROLOL TARTRATE 25 MG: 25 TABLET ORAL at 07:59

## 2017-12-11 RX ADMIN — SENNA 17.2 MG: 8.6 TABLET, COATED ORAL at 21:13

## 2017-12-11 RX ADMIN — VITAMIN D, TAB 1000IU (100/BT) 5000 UNITS: 25 TAB at 07:59

## 2017-12-11 RX ADMIN — METOPROLOL TARTRATE 12.5 MG: 25 TABLET ORAL at 21:13

## 2017-12-11 RX ADMIN — FAMOTIDINE 20 MG: 20 TABLET, FILM COATED ORAL at 21:13

## 2017-12-11 RX ADMIN — RIVAROXABAN 15 MG: 15 TABLET, FILM COATED ORAL at 17:09

## 2017-12-11 RX ADMIN — BACITRACIN 1 G: 500 OINTMENT TOPICAL at 21:14

## 2017-12-11 ASSESSMENT — PAIN SCALES - GENERAL
PAINLEVEL_OUTOF10: 0

## 2017-12-11 NOTE — PROGRESS NOTES
[address] [address]  Recreational Therapy  Evaluation  Inpatient Rehabilitation Unit      Time Spent with Patient: 15 minutes    Date:  12/11/2017       Patient Name: Ileana Rasheed      MRN: [id number]       YOB: 1985 (33 y.o.)       Gender: male  Diagnosis: diffuse axonal brain injury with loss of consciousness   Referring Practitioner: Pio Fong MD    RESTRICTIONS/PRECAUTIONS:  Restrictions/Precautions: General Precautions, Fall Risk  Vision: Within Functional Limits  Hearing: Within functional limits    PAIN: 0    SUBJECTIVE:  Pt lives with girlfriend and 15 yr old daughter, 1 yr old daughter and 3 1/2 yr old son     VISION:  Within Normal Limit    HEARING: Within Normal Limit    LEISURE INTERESTS:   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 and things around the house-pt enjoys playing with his kids and states he plays some solitaire -gave pt deck of cards to use in free time-has 2 cats at home that are his girlfriends - he has some car magazines in his room that he enjoys reading     [address] [facility]:    Decreased endurance    FIMS  Social Interaction: 4 - Patient appropriate 75-90%+ of the time    Patient Education  New Education Provided: Importance of Leisure, RT Plan of Care    Plan:  Continue to follow patient through this admission  Include patient in appropriate groups  See patient individually    Electronically signed by: TYESHA Burris  Date: 12/11/2017

## 2017-12-11 NOTE — PLAN OF CARE
Problem: Falls - Risk of  Goal: Absence of falls  Outcome: Met This Shift  Uses call light    Problem: Health Behavior:  Goal: Identification of resources available to assist in meeting health care needs will improve  Identification of resources available to assist in meeting health care needs will improve   Outcome: Met This Shift  Determining needs    Problem: Safety:  Goal: Ability to remain free from injury will improve  Ability to remain free from injury will improve   Outcome: Met This Shift  Uses call light    Problem: Bleeding:  Goal: Will show no signs and symptoms of excessive bleeding  Will show no signs and symptoms of excessive bleeding   Outcome: Met This Shift  None noted    Problem: Nutritional:  Goal: Nutritional status will improve  Nutritional status will improve   Outcome: Met This Shift  adequate    Problem: Skin Integrity:  Goal: Demonstration of wound healing without infection will improve  Demonstration of wound healing without infection will improve   Outcome: Met This Shift  Slow healing of right ear and right eyebrow    Problem: Nutrition  Goal: Optimal nutrition therapy  Outcome: Completed Date Met: 12/11/17      Comments: Care plan reviewed with patient andverbalize understanding of the plan of care and contribute to goal setting.
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
## 2017-12-11 NOTE — PROGRESS NOTES
Sit: Modified independent (on flat mat table, extra time needed)  Sit to Supine: Modified independent  Scooting: Modified independent (to scoot forward and back in sitting position)    Transfers  Sit to Stand: Stand by assistance (from bedside chair, couch in apt, mat table, and chair with arms. )  Stand to sit: Stand by assistance     Ambulation 1  Surface: level tile;carpet  Device: No Device  Assistance: Stand by assistance (close SBA)  Quality of Gait: Slowed luzmaria with mild path deviation as pt would drift to the R only upon initial steps when starting to ambulate after sitting for a prolonged period of time. Distance: 150 feet x 2 + 75 feet x 2    Stairs  # Steps : 12  Stairs Height: 6\"  Rails: Bilateral  Assistance: Stand by assistance (close SBA)  Comment: Pt ascended and descended with non reciprocal pattern, no LOB noted. Balance  Comments: Pt completed dynamic standing balance activities: Stepping over 6\" hurdles (5 total) forwards 2 reps, and laterally to side step 2 reps. Needed close SBA for all. Pt then walked to sink, filled 2 cups of water and walked with 2 filled cups down graf to other sink (100 feet) at SBA. Exercises:  Exercises  Comments: Pt completed standing exercises with light handholds on counter: Edmundo LE heel raises, toe raises, mini squats, hip abduction/adduction, hip flexion marches, hamstring curls, and hip extension x 15 reps. Supine bridging with 5 sec hold and arms across chest, bridging with hip abduction/adduction at top of bridge, and S. L.R x 15 reps each. Seated Edmundo LE hip abduction/adduction with green T-band x 15 reps each. All ex to increase strength/endurance for improved functional mobiltiy. Activity Tolerance:  Activity Tolerance: Patient Tolerated treatment well    Assessment:   Body structures, Functions, Activity limitations: Decreased functional mobility , Decreased endurance, Decreased balance, Decreased safe awareness, Decreased cognition  Assessment: Patient met 3/5 STGs and 1 LTG. Pt progressing quickly as he is able to walk without an AD at close SBA. Pt still demonstrating decreased strength, balance, and overall endurance. Pt has 2 young children and plans to return home to family with mother to assist while wife is at work. 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 Prognosis: Good  Discharge Recommendations: Continue to assess pending progress, Patient would benefit from continued therapy after discharge    Patient Education:  Patient Education: POC, exercises--handout provided. Barriers to Learning: cognition    Equipment Recommendations:  Equipment Needed: Yes (Continue to assess)    Safety:  Type of devices: Left in chair, Call light within reach, Gait belt, Chair alarm in place, All fall risk precautions in place    Plan:  Times per week: 5x/ wk 90 min, 1x/ wk 30 min  Specific instructions for Next Treatment: therex, dynamic standing balance, transfers, gait with and without AD, steps  Current Treatment Recommendations: Strengthening, Balance Training, Functional Mobility Training, Endurance Training, Home Exercise Program, Safety Education & Training, Patient/Caregiver Education & Training, Equipment Evaluation, Education, & procurement, Transfer Training, Gait Training, Neuromuscular Re-education, Stair training    Goals:  Patient goals : get home and walking better. Short term goals  Time Frame for Short term goals: 1 wk  Short term goal 1: Pt to go supine <->sit, SBA, to get in/out of bed.--GOAL MET, see LTG  Short term goal 2: Pt to get up/down from various seated surfaces, CGA, to get up to walk. --GOAL MET, see LTG  Short term goal 3: Pt to walk with RW >= 100 ft, SBA, indoor surfaces, to progress to home and community mobility.--GOAL MET, see LTG  Short term goal 4: Pt to ascend/descend porch step with RW , CGA, for home entry.--NOT ADDRESSED THIS SESSION  Short term goal 5: Pt to get in/out of car, CGA, for transportation needs.--NOT [facility] term goals  Time Frame for Long term goals : 3 wks.   Long term goal 1: Pt to go supine <->sit, Mod I, to get in/out of bed.--GOAL MET  Long term goal 2: Pt to get up/down from various seated surfaces, Mod I, to get up to walk.--NOT MET  Long term goal 3: Pt to walk with RW >= 150 ft, Mod I for community mobility, >= 50 ft, no devices, Mod I for home mobility.--NOT MET  Long term goal 4: Pt to ascend/descend porch step Mod I, no devices,  for home entry.--NOT MET  Long term goal 5: Pt to ascend/descend 4+ steps with donovan rails, Mod I, for community mobility--NOT MET

## 2017-12-11 NOTE — PROGRESS NOTES
stating Sarah Valles in reverse order     Independent recall of ST name/department without use of external aid upon ST arrival     Recall of breakfast items--3/5 indep, 1/5 extra time, did not elicit 1/5  **excellent pt retention of daily therapy tasks (i.e. Making pancakes with OT with pt indep stating \"you missed some mean pancakes I made this morning\". ) Information confirmed by Wolf Theodore to follow. SHORT TERM GOAL #2:  Goal 2: Pt will complete high level selective, alternating and divided attention tasks with no more than x2-3 errors/redirections within a 2 minute task to improve basic attention for return to management of IADL functions. GOAL NOT ADDRESSED- CONTINUE   INTERVENTIONS: Pt with adequate sustained attention to checkbook register task for x15+ minutes without need for cues/redirections. Pt with multiple hesitations given decreased organization and executive functioning. Did require extra time to attend focus back to task with challenges evident. SHORT TERM GOAL #3:  Goal 3: Pt will complete high level organization, reasoning and executive functioning tasks with 75% accuracy given mod cues to improve complex organization and planning. GOAL NOT MET- CONTINUE   INTERVENTIONS: Organization of checkbook register task-- indep completion of 5/8 trials, self corrections following indep self check by pt in 2/8 trials, min-mod cues to improve organizational errors in 1/8   *pt placing \"fee\" under deposit amount vs payment with need for cues to ID and correct   Written computation to determine end balance-- x2 trials with max cues required   *pt with decreased organization for written computation evidenced by poor matching up of numbers in columns and decreased working retention for \"borrowing\"    SHORT TERM GOAL #4:  Goal 4: Pt will complete complex naming, verbal description and written expressive tasks with 80% accuracy, mod cues for maximized expression of complex wants/needs.   GOAL NOT ADDRESSED- CONTINUE well []Tolerated fair []Required rest breaks []Fatigued  Plan for Next Session: complex attention, functional carryover, executive functions   Education:  Learner:  [x]Patient          []Significant Other          []Other  Education provided regarding:  [x]Goals and POC   []Diet and swallowing precautions    []Home Exercise Program  [x]Progress and/or discharge information  Method of Education:  [x]Discussion          [x]Demonstration          []Handout          []Other  Evaluation of Education:   [x]Verbalized understanding   []Demonstrates without assistance  [x]Demonstrates with assistance  [x]Needs further instruction     []No evidence of learning                  [x]No family present      Plan: [x]Continue with current plan of care    []Modify current plan of care as follows:    []Discharge patient    Discharge Location:    Services/Supervision Recommended:     [x]Patient continues to require treatment by a licensed therapist to address functional deficits as outlined in the established plan of care.     Hollis Peterson M.S. [id number] [place] [id number] 12/11/2017

## 2017-12-11 NOTE — PROGRESS NOTES
Pt. awake at 0715 and up to shower. Eye brow laceration дмитрий dry dark brown scabbed. And right ear abrasion slowly healing and dry brown scabbed. No drainage from ear. Also дмитрий.  Lightlamp used for 30 min.s after breakfast.
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
## 2017-12-11 NOTE — PROGRESS NOTES
last 72 hours. Lactic Acid: No results for input(s): LACTA in the last 72 hours. Troponin: No results for input(s): CKTOTAL, CKMB, TROPONINT in the last 72 hours. BNP: No results for input(s): BNP in the last 72 hours. Lipids: No results for input(s): CHOL, TRIG, HDL, LDLCALC in the last 72 hours. Invalid input(s): LDL  ABGs: Lab Results   Component Value Date    PH 7.45 12/04/2017    PCO2 31 12/04/2017    PO2 81 12/04/2017    HCO3 22 12/04/2017    O2SAT 97 12/04/2017       Radiology reports as per the Radiologist  Radiology: Us Thyroid    Result Date: 12/8/2017  PROCEDURE: US THYROID CLINICAL INFORMATION: THYROID DISEASE, T3 and T4 elevated with appropriately decreased TSH, s/p TBI with persistent tachycardia and HTN . COMPARISON: No prior study. TECHNIQUE: Grayscale and color Doppler ultrasound FINDINGS: Innumerable low-density lesions throughout both thyroid lobes. These are not measured and all are well under a centimeter in size. Vascularity does not appear to be significantly increased there is a 6 mm nodule in the right lateral thyroid lobe which is in the very low suspicion category according to the SULY. Right Thyroid - 5.18 x 2.30 x 1.88 cm Left Thyroid -4.91 x 2.03 x 1.54 cm Isthmus -0.55 cm     Probable multinodular goiter. Largest nodule identified is 6 mm. Follow-up in 6 months to one year **This report has been created using voice recognition software. It may contain minor errors which are inherent in voice recognition technology. ** Final report electronically signed by Dr. Miki Robles on 12/8/2017 8:14 PM       Physical Exam:  Vitals: BP (!) 133/56   Pulse 87   Temp 97.8 °F (36.6 °C) (Oral)   Resp 16   Ht 6' 3\" (1.905 m)   Wt 268 lb 11.2 oz (121.9 kg)   SpO2 98%   BMI 33.59 kg/m²   24 hour intake/output:  Intake/Output Summary (Last 24 hours) at 12/11/17 1121  Last data filed at 12/11/17 0853   Gross per 24 hour   Intake             1440 ml   Output                0 ml   Net

## 2017-12-12 LAB
IRON: 49 UG/DL (ref 65–195)
T3 FREE: 7.25 PG/ML (ref 2.02–4.43)
T4 FREE: 2.86 NG/DL (ref 0.93–1.76)
TOTAL IRON BINDING CAPACITY: 218 UG/DL (ref 171–450)
TSH SERPL DL<=0.05 MIU/L-ACNC: < 0.005 UIU/ML (ref 0.4–4.2)

## 2017-12-12 PROCEDURE — 83516 IMMUNOASSAY NONANTIBODY: CPT

## 2017-12-12 PROCEDURE — 97532 HC COGNITIVE THERAPY 15 MIN: CPT

## 2017-12-12 PROCEDURE — 97530 THERAPEUTIC ACTIVITIES: CPT

## 2017-12-12 PROCEDURE — 97116 GAIT TRAINING THERAPY: CPT

## 2017-12-12 PROCEDURE — 1180000000 HC REHAB R&B

## 2017-12-12 PROCEDURE — 82390 ASSAY OF CERULOPLASMIN: CPT

## 2017-12-12 PROCEDURE — 86038 ANTINUCLEAR ANTIBODIES: CPT

## 2017-12-12 PROCEDURE — 86704 HEP B CORE ANTIBODY TOTAL: CPT

## 2017-12-12 PROCEDURE — 6370000000 HC RX 637 (ALT 250 FOR IP): Performed by: PHYSICIAN ASSISTANT

## 2017-12-12 PROCEDURE — 84443 ASSAY THYROID STIM HORMONE: CPT

## 2017-12-12 PROCEDURE — 97535 SELF CARE MNGMENT TRAINING: CPT

## 2017-12-12 PROCEDURE — 36415 COLL VENOUS BLD VENIPUNCTURE: CPT

## 2017-12-12 PROCEDURE — 97110 THERAPEUTIC EXERCISES: CPT

## 2017-12-12 PROCEDURE — 83550 IRON BINDING TEST: CPT

## 2017-12-12 PROCEDURE — 84481 FREE ASSAY (FT-3): CPT

## 2017-12-12 PROCEDURE — 83540 ASSAY OF IRON: CPT

## 2017-12-12 PROCEDURE — 84439 ASSAY OF FREE THYROXINE: CPT

## 2017-12-12 PROCEDURE — 6370000000 HC RX 637 (ALT 250 FOR IP): Performed by: PHYSICAL MEDICINE & REHABILITATION

## 2017-12-12 RX ADMIN — BACITRACIN 1 G: 500 OINTMENT TOPICAL at 20:17

## 2017-12-12 RX ADMIN — FAMOTIDINE 20 MG: 20 TABLET, FILM COATED ORAL at 20:15

## 2017-12-12 RX ADMIN — SENNA 17.2 MG: 8.6 TABLET, COATED ORAL at 20:16

## 2017-12-12 RX ADMIN — METOPROLOL TARTRATE 12.5 MG: 25 TABLET ORAL at 20:16

## 2017-12-12 RX ADMIN — BACITRACIN 1 G: 500 OINTMENT TOPICAL at 08:41

## 2017-12-12 RX ADMIN — FAMOTIDINE 20 MG: 20 TABLET, FILM COATED ORAL at 08:39

## 2017-12-12 RX ADMIN — RIVAROXABAN 15 MG: 15 TABLET, FILM COATED ORAL at 08:40

## 2017-12-12 RX ADMIN — VITAMIN D, TAB 1000IU (100/BT) 5000 UNITS: 25 TAB at 08:40

## 2017-12-12 RX ADMIN — METOPROLOL TARTRATE 12.5 MG: 25 TABLET ORAL at 08:39

## 2017-12-12 RX ADMIN — RIVAROXABAN 15 MG: 15 TABLET, FILM COATED ORAL at 17:38

## 2017-12-12 ASSESSMENT — PAIN SCALES - GENERAL
PAINLEVEL_OUTOF10: 0

## 2017-12-12 NOTE — PROGRESS NOTES
level education provided in regards to TBI precautions with high level auditory stimuli with concerns for return to home setting with multiple children. Encouraged assistance in the home to care for kids so pt is able to take multiple rest breaks when needed to reduce risks for overstimulation. Pt verbalized understanding and reports mother is staying with him for x1 week post discharge. SHORT TERM GOAL #3:  Goal 3: Pt will complete high level organization, reasoning and executive functioning tasks with 75% accuracy given mod cues to improve complex organization and planning. INTERVENTIONS: Money word problems related to grocery items-- x1 indep, x1 min cues, x1 min-mod cues   *decreased functional written computation   *decreased comprehension of/attention to task directions    Checkbook register task-- organization of items: 25/29 successful   *errors with missing check number, transfer of incorrect monetary amount, incorrect date   Union of balance-- successful completion of 2/8 trials. Poor success in remaining 6/8 trials with large monetary errors noted. Transferring events onto calendar-- successful in 9/10 trials with incorrect time written x1     AM session: Time questions related to movie theater show times-- 3/6 indep, 3/6 min cues   *decreased attention to details of questions and decreased mental manipulation resulting in errors and need for cues to correct     Money word problems related to grocery items-- x1 indep, x1 min-mod assist   *decreased math computation with need for cues/assist x1    SHORT TERM GOAL #4:  Goal 4: Pt will complete complex naming, verbal description and written expressive tasks with 80% accuracy, mod cues for maximized expression of complex wants/needs.     INTERVENTIONS: responsive naming-- 20/20 indep   Written expression for transferring calendar events-- x4 written errors noted   *perseverations on letters and letter reversals noted   Written expression for 12/12/2017

## 2017-12-12 NOTE — PROGRESS NOTES
Physical Medicine & Rehabilitation  Progress Note      Chief Complaint:  TBI    Subjective:  Care conference held today with proposed discharge date on 12/15 to home with OP PT/OT/SLP. Family present. Methimazole stopped by Endocrinology service; Gastroenterology consulted for ALT elevation. No questions or concerns at this time. Rehabilitation:  Physical therapy: FIMS:  Bed Mobility: Scooting: Modified independent    Transfers: Sit to Stand: Supervision (from bedside chair)  Stand to sit: Supervision  Stand Pivot Transfers: Minimal Assistance (with RW initial attempt with cues and assist to stay within the RW and get aligned up to w/c. Improved to CGA from w/c <->mat.), Ambulation 1  Surface: level tile, ramp  Device: No Device  Assistance: Supervision  Quality of Gait: Able to path find to/from cafeteria without verbal cues. Pt recalled this from a previous trip he took with his mother in a wheelchair. No LOB or unsteadiness noted, no right sided drift noted. Pt able to recall 3 items to find in cafeteria, no LOB as searching for these items in large, crowded cafeteria. Distance: from rehab floor to cafeteria and back. + 150 feet x 1, Stairs  # Steps : 12  Stairs Height: 6\"  Rails: Right ascending  Assistance: Supervision  Comment: Pt ascended and descended with non reciprocal pattern, no LOB noted. No dizziness or unsteadiness noted when turning at the top of the steps to come back down. FIMS: Bed, Chair, Wheel Chair: 5 - Requires setup/supervision/cues  Walk: 5 - Supervision Requires standby supervision or cuing to walk/operate wheelchair at least 150 feet  Distance Walked: 150 feet   Wheel Chair: 4 - Contact Guard/Minimal Assistance Requires up to Contact Guard or Minimal Assistance to walk/operate wheelchair at least 150 feet  Distance Traveled in [address] [place] [address] Street: 150 ft  Stairs: 5 - Exception HouseHold Ambulation Goes up and down 4 to 6 stairs independently, w or w/o a device.  The activity takes more than a reasonable amount of time, or there are safety considerations, PT Equipment Recommendations  Equipment Needed: Yes (Continue to assess), Assessment: Patient did well with session, overall supervision for all functional mobility. No LOB or unsteadiness noted throughout session. Occupational therapy: FIMS:  Eatin - Patient feeds self  Groomin - Requires setup/cues to do all tasks  Bathin - Able to bathe all 10 areas with setup/sup/cues (while standing in shower )  Dressing-Upper: 5 - Requires setup/supervision/cues and/or requires assist with presthesis/brace only  Dressing-Lower: 5 - Requires setup/supervision/cues and/or staff applies TEDS/prosthesis/brace only  Toiletin - Requires steadying assistance only (CGA for clothing management)  Toilet Transfer: 4 - Requires steadying assistance only < 25% assist (CGA to/from toilet vc for safety )  Tub Transfer: 5 - Supervision, set-up, cues  Shower Transfer: 4 - Minimal contact assistance, pt. expends 75% or more effort (to/from shower bench in walk in shower, vc for safety with technique ),  ,      Speech therapy: FIMS: Comprehension: 5 - Patient understands basic needs (hungry/hot/pain)  Expression: 5 - Expresses basic ideas/needs only (hungry/hot/pain)  Social Interaction: 5 - Patient is appropriate with supervision/cues  Problem Solvin - Patient able to solve simple/routine tasks  Memory: 4 - Patient remembers 75-90%+ of the time    IMPRESSIONS: Pt presents with at least mild cognitive impairments evidenced by derived SCCAN raw score of 79/94. Cognitive deficits found in the areas of basic orientation, memory (functional retention of daily medication names/regimen, visual recall and appointment details), written expression (spelling errors, high level syntax structure), basic attention (i.e. Selective visual attention) and executive functioning skills (i.e. Clock drawing).  Pt demonstrating mildly impaired high level expressive/receptive language deficits evidenced by decreased direction following of complex level/multi step commands, abstract divergent naming and impaired verbal picture description. No overt dysphagia/dysarthria. Would greatly benefit from continued ST services in order to progress skills towards baseline level function for functional return to work/management of IADL management upon discharge.      Review of Systems:  CONSTITUTIONAL:  positive for  fatigue, weight loss and hot flashes  EYES:  negative  HEENT:  negative  RESPIRATORY:  negative  CARDIOVASCULAR:  negative  GASTROINTESTINAL:  negative  GENITOURINARY:  negative  SKIN:  negative  HEMATOLOGIC/LYMPHATIC:  negative  MUSCULOSKELETAL:  positive for  muscle weakness  NEUROLOGICAL:  positive for memory problems, gait problems, dizziness, and weakness  BEHAVIOR/PSYCH:  positive for confusion  10 point system review otherwise negative    Objective:  /70   Pulse 86   Temp 97 °F (36.1 °C) (Oral)   Resp 16   Ht 6' 3\" (1.905 m)   Wt 268 lb 11.2 oz (121.9 kg)   SpO2 97%   BMI 33.59 kg/m²     awake  Orientation:   person, place, time, situation  Mood: dysthymic  Affect: flat  General appearance:  in no acute distress, up on EOB     Memory:   abnormal - impaired TBI  Attention/Concentration: abnormal - able to follow single step commands with mild delay; unable to complete two step commands  Language:  normal     Cranial Nerves:  cranial nerves II-XII are grossly intact  ROM:  normal  Motor Exam:  Motor exam is symmetrical 4 out of 5 all extremities bilaterally     Tone:  normal  Muscle bulk: within normal limits  Sensory:  Sensory intact  Coordination:   abnormal - unable to do BILATERAL HTS accurately, FTN and CINDA intact  Deep Tendon Reflexes:  Reflexes are intact and symmetrical bilaterally     Skin: warm and dry, no rash or erythema, laceration at lateral RIGHT eyebrow  Peripheral vascular: Pulses: Normal upper and lower extremity pulses; Edema: 1+    Diagnostics: Recent Results (from the past 24 hour(s))   TSH with Reflex    Collection Time: 12/12/17  5:32 AM   Result Value Ref Range    TSH <0.005 (L) 0.400 - 4.20 uIU/mL   T4, Free    Collection Time: 12/12/17  5:32 AM   Result Value Ref Range    T4 Free 2.86 (H) 0.93 - 1.76 ng/dL     Impression:  1. 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 2. Mild cognitive impairments SCCAN) completed. Patient scored 79/94. Normal is greater than or equal to 87/94.  3.    Gait instability. 4. Acute respiratory failure requiring intubation. 5. Tachycardia. 6. Multinodular goiter. 7. Pulmonary embolism in medial LEFT lower lobe basilar segmental pulmonary emboli. 8. Incidental 7 x 7 mm RIGHT upper lobe pulmonary nodule. 9. RIGHT ear laceration. 10. HTN. 11. Transaminitis. 12. Nicotine abuse. 13. Hypovitaminosis D, Vitamin 25OH level of 18 on 12/9/2017.     Plan:      Medical management: No provider consulted at this time. .     Consultants: Otolaryngology, Trauma Surgery, Neurosurgery, Critical Care, Plastic Surgery, Internal Medicine, Endocrinology, Gastroenterology, Physical Medicine     Acute/Rehabilitation Problems:  1. TBI with diffuse axonal injury. 1. TBI protocol initiated. 2. Bright light therapy ordered. 3. Family requesting to act as his sitter. 4. Allowed 1 hour of TV starting 12/9 after therapies. Relaxed to unlimited after therapy along with cellphone usage after therapies on 12/11.  5. Allowed off floor 12/12. 2. Mild cognitive impairments. 1. SLP. 3. Gait instability. 1. PT/OT. 1. Ambulated 150' at supervision with no device and also did 10' ramp and 12 6\" steps in the parallel bars. 4. Tachycardia. 1. Metoprolol. Decreased to 12.5mg on 12/11. Will discontinue on 12/14. 2. Likely caused by thyroid abnormality.   3. 24 hour range 86-91.  5. Elevated thyroid provide education and training to patient and family. 15. Prophylaxis:  DVT: Xarelto. GI: Pepcid. 16.  and case management consultations for coordination of care and discharge planning. 1. Letter provided to patient on 12/9 stating that he is in the hospital.  Estimated Length of Stay: 12/15/2017  Destination: outpatient therapies  Appropriate to trial functional independence apartment stay: No   Pass: No  Services at Discharge: Outpatient Physical Therapy, Occupational Therapy and Speech Therapy 3x week  Equipment at Discharge: None     Chronic Problems:  1. Nicotine abuse. 1. Nicoderm.     Labs:  1. Labs reviewed 12/11. 2. Reviewed 12/12.     Infectious Disease:  1.  N/A    Missed Therapy Time:  None     DME:      Greta Libman, MD
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
## 2017-12-12 NOTE — PROGRESS NOTES
[address] [address]  Diagnosis List for Inpatient Rehab facility (IRF) - Patient Assessment Instrument (MARTA)    Patient Name: Tasneem Escobar        MRN: [id number]    : 1985  (33 y.o.)  Gender: male     Primary impairment requiring rehabilitation: 2.22 Traumatic, Closed brain injury     Etiologic Diagnosis that led to the condition: TBI with diffuse axonal injury within the subcortical white matter of the RIGHT frontal lobe; RIGHT thalamus and RIGHT temporal lobe    Comorbid conditions affecting rehabilitation:  1. 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 2. Mild cognitive impairments   3. Gait instability. 4. Acute respiratory failure requiring intubation. 5. Tachycardia. 6. Multinodular goiter. 7. Pulmonary embolism in medial LEFT lower lobe basilar segmental pulmonary emboli. 8. Incidental 7 x 7 mm RIGHT upper lobe pulmonary nodule. 9. RIGHT ear laceration. 10. HTN. 11. Transaminitis. 12. Nicotine abuse.   13. Hypovitaminosis D  14.  Graves Disease
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
## 2017-12-12 NOTE — PROGRESS NOTES
Physical Medicine & Rehabilitation  Progress Note      Chief Complaint:  TBI    Subjective:  TBI restrictions of TV and cellphone relaxed. U/S of thyroid and liver reviewed with patient and mother. \"I'm walking better now. \"  No questions or concerns at this time. Rehabilitation:  Physical therapy: FIMS:  Bed Mobility: Scooting: Modified independent    Transfers: Sit to Stand: Stand by assistance (From BS chair and mat table)  Stand to sit: Stand by assistance  Stand Pivot Transfers: Minimal Assistance (with RW initial attempt with cues and assist to stay within the RW and get aligned up to w/c. Improved to CGA from w/c <->mat.), Ambulation 1  Surface: level tile  Device: No Device  Assistance: Stand by assistance, Contact guard assistance  Quality of Gait: Decreased luzmaria and velocity. Slightly unsteady at initiation of gait but steady as gait continues. Pt. with minimal path deviations. Distance: 150' x 2, Stairs  # Steps : 12  Stairs Height: 6\"  Rails: Bilateral  Assistance: Stand by assistance (close SBA)  Comment: Pt ascended and descended with non reciprocal pattern, no LOB noted. FIMS: Bed, Chair, Wheel Chair: 5 - Requires setup/supervision/cues  Walk: 5 - Supervision Requires standby supervision or cuing to walk/operate wheelchair at least 150 feet  Distance Walked: 150 feet   Wheel Chair: 4 - Contact Guard/Minimal Assistance Requires up to Contact Guard or Minimal Assistance to walk/operate wheelchair at least 150 feet  Distance Traveled in [address] [place] [address] Street: 150 ft  Stairs: 5- [address] supervision(e.g., standing by, cuing, or coaxing) to go up and down one flight of stairs, PT Equipment Recommendations  Equipment Needed: Yes (Continue to assess), Assessment: Pt. tolerated session well. Pt. slightly unsteady with initiating gait but becomes steady as gait progresses. Pt. alert and oriented throughout session.  Pt. would benefit from continued skilled PT to increase strength and endurance to further enhance functional mobility. Occupational therapy: FIMS:  Eatin - Patient feeds self  Groomin - Able to perform 3-4 tasks with touching help (CGA progressing to SBA standing at sink to brush teeth )  Bathin - Able to bathe 8-9 areas (CGA for balance when standing to wash bottom. vc needed for safety d/t pt being impulsive, additional vc for completeness of task )  Dressing-Upper: 5 - Requires setup/supervision/cues and/or requires assist with presthesis/brace only (donned shirt with set up )  Dressing-Lower: 4 - Requires assist with buttons/zippers/shoelaces and/or assist with shoes only (donned pants with CGA for balance while pulling up )  Toiletin - Requires steadying assistance only (CGA for clothing management)  Toilet Transfer: 4 - Requires steadying assistance only < 25% assist (CGA to/from toilet vc for safety )  Shower Transfer: 4 - Minimal contact assistance, pt. expends 75% or more effort (to/from shower bench in walk in shower, vc for safety with technique ),  ,      Speech therapy: FIMS: Comprehension: 4 - Patient understands basic needs 75-90%+ of the time  Expression: 5 - Expresses basic ideas/needs only (hungry/hot/pain)  Social Interaction: 4 - Patient appropriate 75-90%+ of the time  Problem Solvin - Patient solves simple/routine tasks 75-90%+   Memory: 3 - Patient remembers 50%-74% of the time    IMPRESSIONS: Pt presents with at least mild cognitive impairments evidenced by derived SCCAN raw score of 79/94. Cognitive deficits found in the areas of basic orientation, memory (functional retention of daily medication names/regimen, visual recall and appointment details), written expression (spelling errors, high level syntax structure), basic attention (i.e. Selective visual attention) and executive functioning skills (i.e. Clock drawing).  Pt demonstrating mildly impaired high level expressive/receptive language deficits evidenced by decreased direction following of complex level/multi step commands, abstract divergent naming and impaired verbal picture description. No overt dysphagia/dysarthria. Would greatly benefit from continued ST services in order to progress skills towards baseline level function for functional return to work/management of IADL management upon discharge.      Review of Systems:  CONSTITUTIONAL:  positive for  fatigue, weight loss and hot flashes  EYES:  negative  HEENT:  negative  RESPIRATORY:  negative  CARDIOVASCULAR:  negative  GASTROINTESTINAL:  negative  GENITOURINARY:  negative  SKIN:  negative  HEMATOLOGIC/LYMPHATIC:  negative  MUSCULOSKELETAL:  positive for  muscle weakness  NEUROLOGICAL:  positive for memory problems, gait problems, dizziness, and weakness  BEHAVIOR/PSYCH:  positive for confusion  10 point system review otherwise negative    Objective:  BP (!) 137/57   Pulse 91   Temp 97 °F (36.1 °C) (Oral)   Resp 16   Ht 6' 3\" (1.905 m)   Wt 268 lb 11.2 oz (121.9 kg)   SpO2 94%   BMI 33.59 kg/m²     awake  Orientation:   person, place, time, situation  Mood: dysthymic  Affect: flat  General appearance:  in no acute distress, up on EOB     Memory:   abnormal - impaired TBI  Attention/Concentration: abnormal - able to follow single step commands with mild delay; unable to complete two step commands  Language:  normal     Cranial Nerves:  cranial nerves II-XII are grossly intact  ROM:  normal  Motor Exam:  Motor exam is symmetrical 4 out of 5 all extremities bilaterally     Tone:  normal  Muscle bulk: within normal limits  Sensory:  Sensory intact  Coordination:   abnormal - unable to do BILATERAL HTS accurately, FTN and CINDA intact  Deep Tendon Reflexes:  Reflexes are intact and symmetrical bilaterally     Skin: warm and dry, no rash or erythema, laceration at lateral RIGHT eyebrow  Peripheral vascular: Pulses: Normal upper and lower extremity pulses; Edema: 1+    Diagnostics:   Recent Results (from the past 24 hour(s))   Hepatic Function Panel    Collection Time: 12/11/17  9:21 AM   Result Value Ref Range    Alb 4.3 3.5 - 5.1 g/dL    Total Bilirubin 0.7 0.3 - 1.2 mg/dL    Bilirubin, Direct 0.3 0.0 - 0.3 mg/dL    Alkaline Phosphatase 97 38 - 126 U/L    AST 37 5 - 40 U/L     (H) 11 - 66 U/L    Total Protein 7.8 6.1 - 8.0 g/dL     Impression:  1. 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 2. Mild cognitive impairments SCCAN) completed. Patient scored 79/94. Normal is greater than or equal to 87/94.  3.    Gait instability. 4. Acute respiratory failure requiring intubation. 5. Tachycardia. 6. Multinodular goiter. 7. Pulmonary embolism in medial LEFT lower lobe basilar segmental pulmonary emboli. 8. Incidental 7 x 7 mm RIGHT upper lobe pulmonary nodule. 9. RIGHT ear laceration. 10. HTN. 11. Transaminitis. 12. Nicotine abuse. 13. Hypovitaminosis D, Vitamin 25OH level of 18 on 12/9/2017.     Plan:      Medical management: No provider consulted at this time. .     Consultants: Otolaryngology, Trauma Surgery, Neurosurgery, Critical Care, Plastic Surgery, Internal Medicine, Endocrinology, Physical Medicine     Acute/Rehabilitation Problems:  1. TBI with diffuse axonal injury. 1. TBI protocol initiated. 2. Bright light therapy ordered. 3. Family requesting to act as his sitter. 4. Allowed 1 hour of TV starting 12/9 after therapies. Relaxed to unlimited after therapy along with cellphone usage after therapies on 12/11.  2. Mild cognitive impairments. 1. SLP. 3. Gait instability. 1. PT/OT. 1. Ambulated 150' x 2 at [facility] with no device and also did 10' ramp and 12 6\" steps in the parallel bars. 4. Tachycardia. 1. Metoprolol. Decreased to 12.5mg on 12/11. 2. Likely caused by thyroid abnormality. 3. 24 hour range 87-91.  5. Elevated thyroid hormones with appropriately decreased TSH.   1. T3, Free 8.74, 12/9 stating that he is in the hospital.     Chronic Problems:  1. Nicotine abuse. 1. Nicoderm.     Labs:  1. Labs reviewed 12/11.     Infectious Disease:  1.  N/A    Missed Therapy Time:  None     DME:      Will Luis MD

## 2017-12-12 NOTE — PLAN OF CARE
Problem: Falls - Risk of  Goal: Absence of falls  Outcome: Met This Shift  Uses call light    Problem: Health Behavior:  Goal: Identification of resources available to assist in meeting health care needs will improve  Identification of resources available to assist in meeting health care needs will improve   Outcome: Met This Shift  Aware of resources    Problem: Bleeding:  Goal: Will show no signs and symptoms of excessive bleeding  Will show no signs and symptoms of excessive bleeding   Outcome: Met This Shift  None noted    Problem: Nutritional:  Goal: Nutritional status will improve  Nutritional status will improve   Outcome: Met This Shift  adequate    Problem: Skin Integrity:  Goal: Demonstration of wound healing without infection will improve  Demonstration of wound healing without infection will improve   Outcome: Met This Shift  Ear slowly healing    Problem: DISCHARGE BARRIERS  Goal: Patient's continuum of care needs are met  Outcome: Met This Shift  Needs met    Comments: Care plan reviewed with patient and    verbalize understanding of the plan of care and contribute to goal setting.

## 2017-12-12 NOTE — PROGRESS NOTES
Pt. Awake at 0700 and voices no new concerns. Right ear abrasion dry scabbed dark brown black. After mother in therapy oked to allow    pt. To be up in room with mom.

## 2017-12-12 NOTE — PROGRESS NOTES
will complete high level organization, reasoning and executive functioning tasks with 75% accuracy given mod cues to improve complex organization and planning. INTERVENTIONS: Time questions related to movie theater show times-- 3/6 indep, 3/6 min cues   *decreased attention to details of questions and decreased mental manipulation resulting in errors and need for cues to correct     Money word problems related to grocery items-- x1 indep, x1 min-mod assist   *decreased math computation with need for cues/assist x1    SHORT TERM GOAL #4:  Goal 4: Pt will complete complex naming, verbal description and written expressive tasks with 80% accuracy, mod cues for maximized expression of complex wants/needs. INTERVENTIONS: DNT d/t focus on other goals     SHORT TERM GOAL #5:  Goal 5: Pt will follow multi step and complex level commands with 80% accuracy, min cues for improved working retention and functional direction following within therapeutic sessions.     INTERVENTIONS: DNT d/t focus on other goals     Communication  Comprehension: 4 - Patient understands basic needs 75-90%+ of the time  Expression: 5 - Expresses basic ideas/needs only (hungry/hot/pain)  Social Cognition  Social Interaction: 4 - Patient appropriate 75-90%+ of the time  Problem Solvin - Patient solves simple/routine tasks 75-90%+   Memory: 3 - Patient remembers 50%-74% of the time    ASSESSMENT:  Assessment: [x]Progressing towards goals          []Not Progressing towards goals    Patient Tolerance of Treatment:   [x]Tolerated well []Tolerated fair []Required rest breaks []Fatigued  Plan for Next Session: complex attention, functional carryover, executive functions   Education:  Learner:  [x]Patient          []Significant Other          [x]Other- mother   Education provided regarding:  [x]Goals and POC   []Diet and swallowing precautions    []Home Exercise Program  [x]Progress and/or discharge information  Method of Education:  [x]Discussion
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
## 2017-12-13 PROBLEM — E05.00 GRAVES DISEASE: Status: ACTIVE | Noted: 2017-12-08

## 2017-12-13 LAB
ALBUMIN SERPL-MCNC: 3.6 G/DL (ref 3.5–5.1)
ALP BLD-CCNC: 80 U/L (ref 38–126)
ALT SERPL-CCNC: 86 U/L (ref 11–66)
AST SERPL-CCNC: 23 U/L (ref 5–40)
BILIRUB SERPL-MCNC: 0.6 MG/DL (ref 0.3–1.2)
BILIRUBIN DIRECT: < 0.2 MG/DL (ref 0–0.3)
TOTAL PROTEIN: 6.5 G/DL (ref 6.1–8)

## 2017-12-13 PROCEDURE — 6370000000 HC RX 637 (ALT 250 FOR IP): Performed by: PHYSICIAN ASSISTANT

## 2017-12-13 PROCEDURE — 97530 THERAPEUTIC ACTIVITIES: CPT

## 2017-12-13 PROCEDURE — 36415 COLL VENOUS BLD VENIPUNCTURE: CPT

## 2017-12-13 PROCEDURE — 1180000000 HC REHAB R&B

## 2017-12-13 PROCEDURE — 99232 SBSQ HOSP IP/OBS MODERATE 35: CPT | Performed by: NURSE PRACTITIONER

## 2017-12-13 PROCEDURE — 97116 GAIT TRAINING THERAPY: CPT

## 2017-12-13 PROCEDURE — 80076 HEPATIC FUNCTION PANEL: CPT

## 2017-12-13 PROCEDURE — 97110 THERAPEUTIC EXERCISES: CPT

## 2017-12-13 PROCEDURE — 97535 SELF CARE MNGMENT TRAINING: CPT

## 2017-12-13 PROCEDURE — 6370000000 HC RX 637 (ALT 250 FOR IP): Performed by: PHYSICAL MEDICINE & REHABILITATION

## 2017-12-13 PROCEDURE — 97532 HC COGNITIVE THERAPY 15 MIN: CPT

## 2017-12-13 RX ADMIN — RIVAROXABAN 15 MG: 15 TABLET, FILM COATED ORAL at 17:15

## 2017-12-13 RX ADMIN — VITAMIN D, TAB 1000IU (100/BT) 5000 UNITS: 25 TAB at 07:38

## 2017-12-13 RX ADMIN — METOPROLOL TARTRATE 12.5 MG: 25 TABLET ORAL at 21:07

## 2017-12-13 RX ADMIN — BACITRACIN 1 G: 500 OINTMENT TOPICAL at 21:08

## 2017-12-13 RX ADMIN — RIVAROXABAN 15 MG: 15 TABLET, FILM COATED ORAL at 07:38

## 2017-12-13 RX ADMIN — FAMOTIDINE 20 MG: 20 TABLET, FILM COATED ORAL at 21:07

## 2017-12-13 RX ADMIN — METOPROLOL TARTRATE 12.5 MG: 25 TABLET ORAL at 07:37

## 2017-12-13 RX ADMIN — FAMOTIDINE 20 MG: 20 TABLET, FILM COATED ORAL at 07:38

## 2017-12-13 RX ADMIN — BACITRACIN 1 G: 500 OINTMENT TOPICAL at 07:40

## 2017-12-13 RX ADMIN — SENNA 17.2 MG: 8.6 TABLET, COATED ORAL at 21:07

## 2017-12-13 ASSESSMENT — PAIN SCALES - GENERAL: PAINLEVEL_OUTOF10: 0

## 2017-12-13 NOTE — PROGRESS NOTES
Pt. Awake at 0730 and voices slept well with no new concerns.  Abrasion to right ear slow healing with scabbing dark red black old drainage and scabbing.lightlamp used for 30 min.s before breakfast.
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
## 2017-12-13 NOTE — PROGRESS NOTES
Equipment: Wheelchair-manual, Crutches     Objective:  Scooting: Modified independent    Transfers  Sit to Stand: Supervision (no unsteadiness noted)  Stand to sit: Supervision     Ambulation 1  Surface: level tile;ramp  Assistance: Supervision  Quality of Gait: Able to path find to/from [employer] and [employer] without verbal cues. Pt able to recall the 3 items he had to retrieve from cafeteria yesterday. No LOB or unsteadiness noted, no right sided drift throughout walk. Pt walking through tight spaces, crowded environments. Slow luzmaria overall, as pt states he's normally a fast paced walker  Distance: Various areas throughout hospital to challenge path finding and overall recall from previous sessions. Stairs  Comment: Pt completed several flights of steps, from 8th floor, down to 4, and 4th floor up to 7. SBA for all with cues to slow down in order to ensure his entire foot was on step. Pt completed with reciprocal pattern. Balance  Comments: Pt completed dynamic standing balance activities:  Pt kicked soccer ball down hallway with both R and L LE's in order to challenge various speeds of walking, quick direction changes and SLS on both legs. SBA for activity. Exercises:  Exercises  Comments: Pt rode upright stationary bike for 5 minutes in forwards direction and 5 in backwards direction. Pt did not use arms for the majority of the ride. All ex to increase endurance, strength, and overall balance and coordination for improved functional mobility. Activity Tolerance:  Activity Tolerance: Patient Tolerated treatment well    Assessment: Body structures, Functions, Activity limitations: Decreased functional mobility , Decreased endurance, Decreased balance, Decreased safe awareness, Decreased cognition  Assessment: Patient did well with session, overall supervision for all functional mobility. No LOB or unsteadiness noted throughout session.    Prognosis: Good  Discharge Recommendations: Continue ascend/descend 4+ steps with donovan rails, Mod I, for community mobility

## 2017-12-13 NOTE — PROGRESS NOTES
naming, verbal description and written expressive tasks with 80% accuracy, mod cues for maximized expression of complex wants/needs. INTERVENTIONS: Category members (abstract) [i.e. Name a  Unit of measure that begins with \"F\"]-- 10/15 indep, 1/15 extra time, 2/15 min cues, 2/15 mod cues     SHORT TERM GOAL #5:  Goal 5: Pt will follow multi step and complex level commands with 80% accuracy, min cues for improved working retention and functional direction following within therapeutic sessions. INTERVENTIONS: DNT d/t focus on other goals     Communication  Comprehension: 5 - Patient understands basic needs (hungry/hot/pain)  Expression: 5 - Expresses basic ideas/needs only (hungry/hot/pain)  Social Cognition  Social Interaction: 5 - Patient is appropriate with supervision/cues  Problem Solvin - Patient able to solve simple/routine tasks  Memory: 4 - Patient remembers 75-90%+ of the time    ASSESSMENT:  Assessment: [x]Progressing towards goals          []Not Progressing towards goals  SUMMARY: Pt continues to present with at least mild high level cognitive impairments with continued deficits noted within complex retention of newly learned information, high level mental manipulation and thought organization, executive functioning (i.e. Written and computed mathematical computations) as well as high level attention. Improvements in overall complex naming and understanding of complex level material. Pt requires the above skills (organization, executive functioning and complex attention) in order to return to employment setting, driving and  management of IADL functions (i.e. Financial management, cooking, etc). Would highly benefit from continued intensive rehabilitative cognitive therapy in order to progress cognitive functioning to a baseline level for successful return to work responsibilities/daily IADL management upon discharge.      Patient Tolerance of Treatment:   [x]Tolerated well []Tolerated

## 2017-12-13 NOTE — PLAN OF CARE
Problem: DISCHARGE BARRIERS  Goal: Patient's continuum of care needs are met    Intervention: INVOLVE PATIENT/S.O. IN DISCHARGE PLANNING PROCESS  Team Conference held this morning. Recommendations of the team were explained to the patient and mother, Eben Rodrigues, by KRISTOFER Etienne, and Dr. Julia Martínez. Team is recommending that patient continue on acute inpatient rehab for three more days, with expected discharge date of Friday, 12/15/2017. Prior to discharge, team is recommending family education with mother, Eben Rodrigues. Patient's mother, Eben Rodrigues, able to provide twenty four hour supervision upon patient's return home. Family education scheduled with motherEben, on Thursday, 12/14/2017, beginning at 9:00am for all therapy sessions. Therapy board updated accordingly. Following discharge, team is recommending continued PT/OT/ST as outpatient. Patient requesting to complete outpatient therapy in [address] [place] [address] (which ever one has ST available). Care plan reviewed with patient and mother, Eben Rodrigues. Patient and mother, Eben Rodrigues, verbalized understanding of the plan of care and contributed to goal setting. Additional discussion to allow patient's ability to leave unit with family and have children visit in the evening. SW to follow and maintain involvement in discharge planning.

## 2017-12-13 NOTE — PROGRESS NOTES
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 Shortened therapy minutes this date given presence of nurse practitioner in and providing information to pt/mother during scheduled therapy time. **Pt and mother require ongoing education in regards to skilled level TBI education, recommendations for driving evaluation prior to resuming driving tasks and need for further therapy prior to going back to work. Pt will require transition period where he works a couple of days (part time) prior to resuming full time employment when cognitive skills have significantly improved to manage daily scheduling, inventory and overall organizational routines. SHORT TERM GOAL #1:  Goal 1: Pt will complete functional carryover, delayed recall and working recall tasks with 80% accuracy, mod cues for improved retention of newly learned medical information. INTERVENTIONS: Recall of functional appointment-- 4 1/2 hour delay:  indep,  FO2    AM session: Olog--  (discontinue olog tasks with continuation of coglog as appropriate).    Coglog--    *indep completion of immediate repetition, counting backwards 20-1, estimation of 30 second time lapse, repetition of sequential hand movements, and basic attention *spontaneous delayed recall   *Poor success with manipulation of TRACE in reverse order     Recall of functional appointment containing 5 units:   1 minute delay: 4/5 indep, 1/5 prompts (i.e what JEROME did that fall on)   5 minute delay: 4/5 indep, 1/5 prompts   *pt instructed to recall information for BID session. External aid left for pt review as needed    **pt with poor level of organization while writing out scheduled appointment with information randomly written. Will require additional targeting of organizational skills in order to return back to work related functions upon discharge. SHORT TERM GOAL #2:  Goal 2: Pt will complete high level selective, alternating and divided attention tasks with no more than x2-3 errors/redirections within a 2 minute task to improve basic attention for return to management of IADL functions. INTERVENTIONS: DNT d/t focus on other goals   Prior session: Visual scanning of x2 letter targets with television play in background while simultaneously listening for auditory presentation of claps:   Pt with decreased organization noted as trials progressed with multiple distractions present. Required slight extra time to complete task. Pt with x3 visual scanning errors evidenced by omission of targets. No auditory errors. *skilled level education provided in regards to TBI precautions with high level auditory stimuli with concerns for return to home setting with multiple children. Encouraged assistance in the home to care for kids so pt is able to take multiple rest breaks when needed to reduce risks for overstimulation. Pt verbalized understanding and reports mother is staying with him for x1 week post discharge. SHORT TERM GOAL #3:  Goal 3: Pt will complete high level organization, reasoning and executive functioning tasks with 75% accuracy given mod cues to improve complex organization and planning.   INTERVENTIONS: planning organization of vegetables in garden plot when provided with deductive clues-- 5/12 indep, 1/12 extra time, 3/12 min cues, 3/12 mod cues   *pt initially provided with task and instructed to read instructions and complete independently. HIGH level of errors noted with pt noting to rush through task with decreased attention to details and overall organization. Improved success when provided by supervision and cues from [address]. Given high level of organizational deficits pt is not recommended to return to driving and/or employment tasks upon discharge. Should complete driving assessment prior to resumption of driving tasks. AM session:  Medication management task-- 6/7 indep, 1/7 max cues to maximize problem solving to determine inappropriateness to manage PRN medications in pill box     **Review of HEP for checkbook register task-- explained to pt errors involving disregard for sequencing amounts, missing check number, incorrect date written as well as incorrect transfer of amount. Explained need for pt to take his time and double check himself to maximize organizational skills needed for return to work functions. **pt with poor register balance (off by $1,000 dollars+ throughout task). Poor executive functioning skills to utilize calculator with multiple errors noted. Will require ongoing intervention. SHORT TERM GOAL #4:  Goal 4: Pt will complete complex naming, verbal description and written expressive tasks with 80% accuracy, mod cues for maximized expression of complex wants/needs. INTERVENTIONS: AM session: Category members (abstract) [i.e. Name a  Unit of measure that begins with \"F\"]-- 10/15 indep, 1/15 extra time, 2/15 min cues, 2/15 mod cues     SHORT TERM GOAL #5:  Goal 5: Pt will follow multi step and complex level commands with 80% accuracy, min cues for improved working retention and functional direction following within therapeutic sessions.     INTERVENTIONS: 8 element commands-- 4/5 indep, 1/5 min cues     Communication  Comprehension: 5 - Patient understands basic needs (hungry/hot/pain)  Expression: 6 - Device used to express complex ideas/needs  Social Cognition  Social Interaction: 6 - Patient requires medication for mood and/or effect  Problem Solvin - Patient able to solve simple/routine tasks  Memory: 4 - Patient remembers 75-90%+ of the time    ASSESSMENT:  Assessment: [x]Progressing towards goals          []Not Progressing towards goals    Patient Tolerance of Treatment:   [x]Tolerated well []Tolerated fair []Required rest breaks []Fatigued  Plan for Next Session: carryover, organizational tasks, complex direction following   Education:  Learner:  [x]Patient          []Significant Other          [x]Other- mother   Education provided regarding:  [x]Goals and POC   []Diet and swallowing precautions    []Home Exercise Program  [x]Progress and/or discharge information  Method of Education:  [x]Discussion          [x]Demonstration          []Handout          []Other  Evaluation of Education:   [x]Verbalized understanding   [x]Demonstrates without assistance  []Demonstrates with assistance  [x]Needs further instruction     []No evidence of learning                  []No family present      Plan: [x]Continue with current plan of care    []Modify current plan of care as follows:    []Discharge patient    Discharge Location:    Services/Supervision Recommended:     [x]Patient continues to require treatment by a licensed therapist to address functional deficits as outlined in the established plan of care.     BRANDT Keller Range 2017

## 2017-12-13 NOTE — PROGRESS NOTES
input(s): CKTOTAL, CKMB, TROPONINT in the last 72 hours. BNP: No results for input(s): BNP in the last 72 hours. Lipids: No results for input(s): CHOL, TRIG, HDL, LDLCALC in the last 72 hours. Invalid input(s): LDL  ABGs:   Lab Results   Component Value Date    PH 7.45 12/04/2017    PCO2 31 12/04/2017    PO2 81 12/04/2017    HCO3 22 12/04/2017    O2SAT 97 12/04/2017       Radiology reports as per the Radiologist  Radiology: Us Thyroid    Result Date: 12/8/2017  PROCEDURE: US THYROID CLINICAL INFORMATION: THYROID DISEASE, T3 and T4 elevated with appropriately decreased TSH, s/p TBI with persistent tachycardia and HTN . COMPARISON: No prior study. TECHNIQUE: Grayscale and color Doppler ultrasound FINDINGS: Innumerable low-density lesions throughout both thyroid lobes. These are not measured and all are well under a centimeter in size. Vascularity does not appear to be significantly increased there is a 6 mm nodule in the right lateral thyroid lobe which is in the very low suspicion category according to the SUL[facility]. Right Thyroid - 5.18 x 2.30 x 1.88 cm Left Thyroid -4.91 x 2.03 x 1.54 cm Isthmus -0.55 cm     Probable multinodular goiter. Largest nodule identified is 6 mm. Follow-up in 6 months to one year **This report has been created using voice recognition software. It may contain minor errors which are inherent in voice recognition technology. ** Final report electronically signed by Dr. Nicholas Woodruff on 12/8/2017 8:14 PM       Physical Exam:  Vitals: /63   Pulse 79   Temp 95.7 °F (35.4 °C)   Resp 16   Ht 6' 3\" (1.905 m)   Wt 268 lb 11.2 oz (121.9 kg)   SpO2 96%   BMI 33.59 kg/m²   24 hour intake/output:    Intake/Output Summary (Last 24 hours) at 12/13/17 1413  Last data filed at 12/12/17 1647   Gross per 24 hour   Intake              240 ml   Output                0 ml   Net              240 ml     Last 3 weights:   Wt Readings from Last 3 Encounters:   12/11/17 268 lb 11.2 oz (121.9 kg)   12/08/17 274 lb (124.3 kg)       General:  Awake, alert, not in distress. HEENT: traumatic, normocephalic. Pink conjunctiva, Anicteric sclera. Pink and moist oral mucosa. Trachea midline. Neck supple. Chest: Bilateal air entry, Clear to auscultation, no wheezing, rhonchi or rales. Cardiovascular: RRR, O9R3, no murmur, click, rub or gallop. No lower extremity edema. Pedal and posterior tibialis 2+. Abdomen: Soft, non tender to palpation. Active bowel sounds x 4 quadrants. Musculoskeletal: passive and active ROM x 4 extremities. Integumentary: Pink, warm and dry. Abrasions and lac to head. CNS: Oriented to person, place and time. Normal speech. Normal sensation. Face symmetrical. No tremor. Psych: Mood and Affect normal.                       Assessment / Plan:    1. Hyperthyroidism secondary to Graves disease with elevated TSI. Failed Tapazole with worsening LFT's. GI consulted and does not recommend restarting Tapazole/PTU. LFT's have improved since discontinuing. Discussed RAIA with Dr. Jennifer Cee, and due to [facility] is not an option. Definitively will need thyroidectomy. TFTs are uncontrolled. However, patient is on Xarelto for PE. Question transfer to tertiary center vs OP F/U. Will discuss more in depth with Dr. Jennifer Cee. 2. Multinodular goiter. Largest nodule 6 mm. Will follow OP. 3. Hypovitaminosis D. Replacement started. 4. Traumatic brain injury.     Dispo: Discussed case with Dr. Garett Ford     Electronically signed by Bruce Oneill CNP on 12/13/2017 at 2:13 PM  Rounding for Dr. Jennifer Cee, Endocrinologist.

## 2017-12-13 NOTE — PROGRESS NOTES
Crutches     Objective:      Transfers  Sit to Stand: Supervision  Stand to sit: Supervision       Ambulation 1  Surface: level tile  Device: No Device  Assistance: Supervision  Quality of Gait: cues for more heelstrike, and foot clearance   Distance: approx 180 feet x 2 and 15 feet x 2                  Exercises:  Exercises  Comments: pt stood at [place] following written HEP on wall with only approx 25% vcs' to correct interpretation; pt used light UE support 80% of time and no UE support the rest of time ; approx 15 reps each heel raises, toe raises , minisquats, hip ABD/ADD, marches, hip ext, and HS curls all for improved endurnace strength with gait training;   pt took short seated RB and then perfromed rebounder activity for 5 minutes straight with 2 pound ball, no LOB ; PTA at close SBA x 1           Activity Tolerance:  Activity Tolerance: Patient Tolerated treatment well    Assessment: Body structures, Functions, Activity limitations: Decreased functional mobility , Decreased endurance, Decreased balance, Decreased safe awareness, Decreased cognition  Assessment: pt showing good safety awareness and needed only one short RB this session, pt on feet nearly entire 30 minute session; rec continued skilled therapy to educate in HEP   Prognosis: Good  Discharge Recommendations: Continue to assess pending progress, Patient would benefit from continued therapy after discharge    Patient Education:  Patient Education: HEP  Barriers to Learning: cognition    Equipment Recommendations:  Equipment Needed: No (None at this time)    Safety:  Type of devices:  All fall risk precautions in place, Left in chair, Call light within reach, Chair alarm in place    Plan:  Times per week: 5x/ wk 90 min, 1x/ wk 30 min  Specific instructions for Next Treatment: therex, dynamic standing balance, transfers, gait with and without AD, steps  Current Treatment Recommendations: Strengthening, Balance Training, Functional

## 2017-12-14 PROBLEM — S60.519A HAND ABRASION: Status: RESOLVED | Noted: 2017-11-27 | Resolved: 2017-12-14

## 2017-12-14 PROBLEM — S00.03XA CONTUSION OF PARIETAL REGION OF SCALP: Status: RESOLVED | Noted: 2017-11-27 | Resolved: 2017-12-14
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
## 2017-12-14 PROBLEM — S06.9XAA TBI (TRAUMATIC BRAIN INJURY): Status: RESOLVED | Noted: 2017-11-27 | Resolved: 2017-12-14

## 2017-12-14 LAB
ALBUMIN SERPL-MCNC: 3.3 G/DL (ref 3.5–5.1)
ALP BLD-CCNC: 82 U/L (ref 38–126)
ALT SERPL-CCNC: 76 U/L (ref 11–66)
AST SERPL-CCNC: 25 U/L (ref 5–40)
BILIRUB SERPL-MCNC: 0.5 MG/DL (ref 0.3–1.2)
BILIRUBIN DIRECT: < 0.2 MG/DL (ref 0–0.3)
CERULOPLASMIN: 27 MG/DL (ref 17–54)
HEPATITIS B CORE TOTAL ANTIBODY: NEGATIVE
T4 FREE: 2.56 NG/DL (ref 0.93–1.76)
TOTAL PROTEIN: 6.5 G/DL (ref 6.1–8)
VITAMIN D 25-HYDROXY: 21 NG/ML (ref 30–100)

## 2017-12-14 PROCEDURE — 97535 SELF CARE MNGMENT TRAINING: CPT

## 2017-12-14 PROCEDURE — 6370000000 HC RX 637 (ALT 250 FOR IP): Performed by: PHYSICAL MEDICINE & REHABILITATION

## 2017-12-14 PROCEDURE — 99231 SBSQ HOSP IP/OBS SF/LOW 25: CPT | Performed by: INTERNAL MEDICINE

## 2017-12-14 PROCEDURE — 1180000000 HC REHAB R&B

## 2017-12-14 PROCEDURE — 36415 COLL VENOUS BLD VENIPUNCTURE: CPT

## 2017-12-14 PROCEDURE — 82306 VITAMIN D 25 HYDROXY: CPT

## 2017-12-14 PROCEDURE — 97116 GAIT TRAINING THERAPY: CPT

## 2017-12-14 PROCEDURE — 84439 ASSAY OF FREE THYROXINE: CPT

## 2017-12-14 PROCEDURE — 97532 HC COGNITIVE THERAPY 15 MIN: CPT

## 2017-12-14 PROCEDURE — 80076 HEPATIC FUNCTION PANEL: CPT

## 2017-12-14 PROCEDURE — 97530 THERAPEUTIC ACTIVITIES: CPT

## 2017-12-14 PROCEDURE — 97110 THERAPEUTIC EXERCISES: CPT

## 2017-12-14 PROCEDURE — 6370000000 HC RX 637 (ALT 250 FOR IP): Performed by: PHYSICIAN ASSISTANT

## 2017-12-14 RX ADMIN — FAMOTIDINE 20 MG: 20 TABLET, FILM COATED ORAL at 08:12

## 2017-12-14 RX ADMIN — VITAMIN D, TAB 1000IU (100/BT) 5000 UNITS: 25 TAB at 08:10

## 2017-12-14 RX ADMIN — RIVAROXABAN 15 MG: 15 TABLET, FILM COATED ORAL at 08:10

## 2017-12-14 RX ADMIN — RIVAROXABAN 15 MG: 15 TABLET, FILM COATED ORAL at 17:55

## 2017-12-14 RX ADMIN — METOPROLOL TARTRATE 12.5 MG: 25 TABLET ORAL at 20:08

## 2017-12-14 RX ADMIN — BACITRACIN 1 G: 500 OINTMENT TOPICAL at 20:04

## 2017-12-14 RX ADMIN — FAMOTIDINE 20 MG: 20 TABLET, FILM COATED ORAL at 20:05

## 2017-12-14 RX ADMIN — BACITRACIN 1 G: 500 OINTMENT TOPICAL at 08:12

## 2017-12-14 RX ADMIN — SENNA 17.2 MG: 8.6 TABLET, COATED ORAL at 20:05

## 2017-12-14 RX ADMIN — METOPROLOL TARTRATE 12.5 MG: 25 TABLET ORAL at 14:24

## 2017-12-14 ASSESSMENT — PAIN SCALES - GENERAL: PAINLEVEL_OUTOF10: 0
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
## 2017-12-14 NOTE — PROGRESS NOTES
cranial nerves II-XII are grossly intact  ROM:  normal  Motor Exam:  Motor exam is symmetrical 4 out of 5 all extremities bilaterally     Tone:  normal  Muscle bulk: within normal limits  Sensory:  Sensory intact  Coordination:   abnormal - unable to do BILATERAL HTS accurately, FTN and CINDA intact  Deep Tendon Reflexes:  Reflexes are intact and symmetrical bilaterally     Skin: warm and dry, no rash or erythema, laceration at lateral RIGHT eyebrow  Peripheral vascular: Pulses: Normal upper and lower extremity pulses; Edema: 1+    Diagnostics:   Recent Results (from the past 24 hour(s))   Hepatic Function Panel    Collection Time: 12/13/17  6:14 AM   Result Value Ref Range    Alb 3.6 3.5 - 5.1 g/dL    Total Bilirubin 0.6 0.3 - 1.2 mg/dL    Bilirubin, Direct <0.2 0.0 - 0.3 mg/dL    Alkaline Phosphatase 80 38 - 126 U/L    AST 23 5 - 40 U/L    ALT 86 (H) 11 - 66 U/L    Total Protein 6.5 6.1 - 8.0 g/dL     Impression:  1. 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 2. Mild cognitive impairments SCCAN) completed. Patient scored 79/94. Normal is greater than or equal to 87/94.  3.    Gait instability. 4. Acute respiratory failure requiring intubation. 5. Tachycardia. 6. Graves Disease. 7. Pulmonary embolism in medial LEFT lower lobe basilar segmental pulmonary emboli. 8. Incidental 7 x 7 mm RIGHT upper lobe pulmonary nodule. 9. RIGHT ear laceration. 10. HTN. 11. Transaminitis. 12. Nicotine abuse. 13. Hypovitaminosis D, Vitamin 25OH level of 18 on 12/9/2017.     Plan:      Medical management: No provider consulted at this time. .     Consultants: Otolaryngology, Trauma Surgery, Neurosurgery, Critical Care, Plastic Surgery, Internal Medicine, Endocrinology, Gastroenterology, Physical Medicine     Acute/Rehabilitation Problems:  1. TBI with diffuse axonal injury.   1. TBI protocol Disease:  1.  N/A    Missed Therapy Time:  None     DME:      Lena Reece MD
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
## 2017-12-14 NOTE — PROGRESS NOTES
cues (used a grab bar and shower chair. Supervision)  Shower Transfer: 4 - Minimal contact assistance, pt. expends 75% or more effort (to/from shower bench in walk in shower, vc for safety with technique )    Locomotion  Primary Mode: Walk  Distance Walked: 150 ft. Distance Traveled in Wheel Chair: 150 ft  Walk: 5 - Supervision Requires standby supervision or cuing to walk/operate wheelchair at least 150 feet  Wheel Chair: 4 - Contact Guard/Minimal Assistance Requires up to Contact Guard or Minimal Assistance to walk/operate wheelchair at least 150 feet  Stairs: 6 - [address] [place] [address] goes up and down at least one flight of stairs but requries a side support, handrail, cane, or portable supports, or the activity takes more than a reasonable amount of times, or there are safety considerations  GOAL: Stairs: 12    Communication  Comprehension: 6 - Complex ideas 90% or device (hearing aid/glasses)  Expression: 6 - Device used to express complex ideas/needs    Social Cognition  Social Interaction: 6 - Patient requires medication for mood and/or effect  Problem Solvin - Patient able to solve simple/routine tasks  Memory: 5 - Patient requires prompting with stress/unfamiliar situations    EQUIPMENT AND DEVICES:  Assistive Devices  Assistive Devices: None    SWALLOWING:   Difficulty Chewing or Swallowing Food: No    VISION AND HEARING:   Sensory Problems  Visual impairment: None    PHYSICIANS INVOLVED WITH CARE:    Chanel Worrell MD  No ref.  provider found  MD Chanel Silverman MD      Immunization History   Administered Date(s) Administered    Influenza, Dacia Torreskins, 3 yrs and older, IM, Preservative Free 2017    Tdap (Boostrix, Adacel) 2017         Portable Patient Profile was provided to patient and/or family on 2017

## 2017-12-14 NOTE — PLAN OF CARE
Problem: Nutrition  Goal: Optimal nutrition therapy  Outcome: Ongoing  Nutrition Problem: Increased nutrient needs  Intervention: Food and/or Nutrient Delivery: Continue current diet, continue ONS  Nutritional Goals: Pt will consume 75% or more of meals during LOS

## 2017-12-14 NOTE — PROGRESS NOTES
independently, 3/7 given F02. Instruction provided to pt and pt's mother re: compensatory strategies related to calendar, list generation, and notetaking as related to questions. Both parties highly receptive towards strategies, indicating implementation of a novel notebook to assist with retention of knowledge related to novel med dx. Recall of functional appointment-- 4 1/2 hour delay:  indep,  FO2    Cog Lo/30  *independent completion of immediate repetition, counting backwards 20-1, estimation of 30 second time lapse, repetition of sequential hand movements, and basic attention  *spontaneous delayed recall of address  *improved success with manipulation of TRACE in reverse order (omission of May)    SHORT TERM GOAL #2:  Goal 2: Pt will complete high level selective, alternating and divided attention tasks with no more than x2-3 errors/redirections within a 2 minute task to improve basic attention for return to management of IADL functions. INTERVENTIONS: DNT d/t focus on other goals     Prior session: Visual scanning of x2 letter targets with television play in background while simultaneously listening for auditory presentation of claps:   Pt with decreased organization noted as trials progressed with multiple distractions present. Required slight extra time to complete task. Pt with x3 visual scanning errors evidenced by omission of targets. No auditory errors. *skilled level education provided in regards to TBI precautions with high level auditory stimuli with concerns for return to home setting with multiple children. Encouraged assistance in the home to care for kids so pt is able to take multiple rest breaks when needed to reduce risks for overstimulation. Pt verbalized understanding and reports mother is staying with him for x1 week post discharge.      SHORT TERM GOAL #3:  Goal 3: Pt will complete high level organization, reasoning and executive functioning tasks with 75% accuracy given mod cues to improve complex organization and planning. INTERVENTIONS: Time management word problem task with pt directed to compute time problems given auditory presentation. Task completed with 8/10 independently, 2/10 given mod verbal reasoning cues for calculation errors. *increased processing time required for task progression to completion. SHORT TERM GOAL #4:  Goal 4: Pt will complete complex naming, verbal description and written expressive tasks with 80% accuracy, mod cues for maximized expression of complex wants/needs. INTERVENTIONS: Convergent naming (abstract) [i.e. Name a city that begins with \"A\"]: 10/13 independently,  extra time,  given mod verbal cues  *min deficits related to mental flexibility     Prior session:   Category members (abstract) [i.e. Name a  Unit of measure that begins with \"F\"]-- 10/15 indep, 1/15 extra time, 2/15 min cues, 2/15 mod cues     SHORT TERM GOAL #5:  Goal 5: Pt will follow multi step and complex level commands with 80% accuracy, min cues for improved working retention and functional direction following within therapeutic sessions. INTERVENTIONS: DNT due to focus on additional STGs.     Prior session: 8 element commands-- 4/5 indep, 1/5 min cues     Communication  Comprehension: 6 - Complex ideas 90% or device (hearing aid/glasses)  Expression: 6 - Device used to express complex ideas/needs  Social Cognition  Social Interaction: 6 - Patient requires medication for mood and/or effect  Problem Solvin - Patient able to solve simple/routine tasks  Memory: 5 - Patient requires prompting with stress/unfamiliar situations    ASSESSMENT:  Assessment: [x]Progressing towards goals          []Not Progressing towards goals    Patient Tolerance of Treatment:   [x]Tolerated well []Tolerated fair []Required rest breaks []Fatigued  Plan for Next Session: carryover, organizational tasks, complex direction following   Education:  Learner:  [x]Patient []Significant Other          [x]Other- mother   Education provided regarding:  [x]Goals and POC   []Diet and swallowing precautions    []Home Exercise Program  [x]Progress and/or discharge information  Method of Education:  [x]Discussion          [x]Demonstration          []Handout          []Other  Evaluation of Education:   [x]Verbalized understanding   [x]Demonstrates without assistance  []Demonstrates with assistance  [x]Needs further instruction     []No evidence of learning                  []No family present      Plan: [x]Continue with current plan of care    []Modify current plan of care as follows:    []Discharge patient    Discharge Location:    Services/Supervision Recommended:     [x]Patient continues to require treatment by a licensed therapist to address functional deficits as outlined in the established plan of care.     Roxanna Greer M.A., [address] [place] [address]

## 2017-12-14 NOTE — PROGRESS NOTES
House  Home Layout: One level  Home Access: Level entry, Stairs to enter without rails (Level Entry through garage)  Entrance Stairs - Number of Steps: 1 ([address])  Home Equipment: [address], Crutches     Objective:  Rolling to Left: Independent  Rolling to Right: Independent  Supine to Sit: Independent  Sit to Supine: Independent  Scooting: Independent    Transfers  Sit to Stand: Modified independent  Stand to sit: Modified independent  Car Transfer: Supervision (Needing cues for watching head clearance, no other assist needed)     Ambulation 1  Surface: level tile  Device: No Device  Assistance: Supervision  Quality of Gait: Mild decreased luzmaria and heel strike on Edmundo LEs, slight decreased foot clearance on Edmundo LEs. Mod. I for short distances in room, [address] for longer distances. Distance: 130 ft. x1;  community distances in hospital [facility]   Comments: Patient path finding down in busy hospital [facility],  had him find different elevators,  and different items inside gift shop. Did require 1 cue for remembering 1 object in gift shop. Stairs  # Steps : 12  Stairs Height: 6\"  Rails: Right ascending  Assistance: Modified independent   Comment: Patient completed 12 steps with right ascending rail,  no cues required for technique,  performed reciprocal technique. Balance  Comments: Completed standing dynamic balance of: weaving in/out of cones placed on floor with no UE support, after cones stepped over 5, 6inch hurdles with no AD going forward and laterally;  tapped cones with alternating feet to work on SLS and then bent down to floor to pick cones up,  mild unsteadiness with activities but no LOB noted. Exercises:  Exercises  Comments: Tha Myron stood at [employer] and comleted standing HEP of Edmundo LE heel/toe raises, marches, mini squats, hip ABD/ADD, HS curls, hip extension 2x10,  did hold onto counter with 1-2 UEs for support throughout,  occasional cues for technique. All for increased strength/endurance for improved functional mobiilty. Activity Tolerance:  Activity Tolerance: Patient Tolerated treatment well    Assessment: Body structures, Functions, Activity limitations: Decreased functional mobility , Decreased endurance, Decreased balance, Decreased safe awareness, Decreased cognition  Assessment: Patient tolerated session well,  showing good safety awareness throughout session with very little cues needed. Endurance is improving and required only several short rest breaks throughout session. Prognosis: Good  Discharge Recommendations: Continue to assess pending progress, Patient would benefit from continued therapy after discharge    Patient Education:  Patient Education: HEP, step training, car transfer, path finding in busier environment  Barriers to Learning: cognition    Equipment Recommendations:  Equipment Needed: No (None at this time)    Safety:  Type of devices: Left in chair, Gait belt    Plan:  Times per week: 5x/ wk 90 min, 1x/ wk 30 min  Specific instructions for Next Treatment: therex, dynamic standing balance, transfers, gait with and without AD, steps  Current Treatment Recommendations: Strengthening, Balance Training, Functional Mobility Training, Endurance Training, Home Exercise Program, Safety Education & Training, Patient/Caregiver Education & Training, Equipment Evaluation, Education, & procurement, Transfer Training, Gait Training, Neuromuscular Re-education, Stair training    Goals:  Patient goals : get home and walking better. Short term goals  Time Frame for Short term goals: 1 wk  Short term goal 1: Pt to go supine <->sit, SBA, to get in/out of bed.--GOAL MET, see LTG  Short term goal 2: Pt to get up/down from various seated surfaces, CGA, to get up to walk. --GOAL MET, see LTG  Short term goal 3: Pt to walk with RW >= 100 ft, SBA, indoor surfaces, to progress to home and community mobility.--GOAL MET, see LTG  Short term goal 4: Pt to ascend/descend porch step with RW , CGA, for home entry. Short term goal 5: Pt to get in/out of car, CGA, for transportation needs. Long term goals  Time Frame for Long term goals : 3 wks. Long term goal 1: Pt to go supine <->sit, Mod I, to get in/out of bed.--GOAL MET  Long term goal 2: Pt to get up/down from various seated surfaces, Mod I, to get up to walk. Long term goal 3: Pt to walk with RW >= 150 ft, Mod I for community mobility, >= 50 ft, no devices, Mod I for home mobility. Long term goal 4: Pt to ascend/descend porch step Mod I, no devices,  for home entry.   Long term goal 5: Pt to ascend/descend 4+ steps with donovan rails, Mod I, for community mobility

## 2017-12-15 VITALS
WEIGHT: 263 LBS | HEIGHT: 75 IN | SYSTOLIC BLOOD PRESSURE: 152 MMHG | DIASTOLIC BLOOD PRESSURE: 62 MMHG | TEMPERATURE: 97.8 F | OXYGEN SATURATION: 97 % | HEART RATE: 88 BPM | RESPIRATION RATE: 18 BRPM | BODY MASS INDEX: 32.7 KG/M2

## 2017-12-15 LAB
ALBUMIN SERPL-MCNC: 3.6 G/DL (ref 3.5–5.1)
ALP BLD-CCNC: 83 U/L (ref 38–126)
ALT SERPL-CCNC: 69 U/L (ref 11–66)
ANA SCREEN: NORMAL
ANION GAP SERPL CALCULATED.3IONS-SCNC: 13 MEQ/L (ref 8–16)
AST SERPL-CCNC: 22 U/L (ref 5–40)
BILIRUB SERPL-MCNC: 0.4 MG/DL (ref 0.3–1.2)
BUN BLDV-MCNC: 15 MG/DL (ref 7–22)
CALCIUM SERPL-MCNC: 9.8 MG/DL (ref 8.5–10.5)
CHLORIDE BLD-SCNC: 98 MEQ/L (ref 98–111)
CO2: 30 MEQ/L (ref 23–33)
CREAT SERPL-MCNC: 0.6 MG/DL (ref 0.4–1.2)
GFR SERPL CREATININE-BSD FRML MDRD: > 90 ML/MIN/1.73M2
GLUCOSE BLD-MCNC: 98 MG/DL (ref 70–108)
POTASSIUM SERPL-SCNC: 4.5 MEQ/L (ref 3.5–5.2)
SODIUM BLD-SCNC: 141 MEQ/L (ref 135–145)
T4 FREE: 2.49 NG/DL (ref 0.93–1.76)
TISSUE TRANSGLUTAMINASE IGA: 0 U/ML (ref 0–3)
TOTAL PROTEIN: 6.5 G/DL (ref 6.1–8)

## 2017-12-15 PROCEDURE — 97530 THERAPEUTIC ACTIVITIES: CPT

## 2017-12-15 PROCEDURE — 84439 ASSAY OF FREE THYROXINE: CPT

## 2017-12-15 PROCEDURE — 97532 HC COGNITIVE THERAPY 15 MIN: CPT

## 2017-12-15 PROCEDURE — 36415 COLL VENOUS BLD VENIPUNCTURE: CPT

## 2017-12-15 PROCEDURE — 6370000000 HC RX 637 (ALT 250 FOR IP): Performed by: PHYSICAL MEDICINE & REHABILITATION

## 2017-12-15 PROCEDURE — 6370000000 HC RX 637 (ALT 250 FOR IP): Performed by: PHYSICIAN ASSISTANT

## 2017-12-15 PROCEDURE — 80053 COMPREHEN METABOLIC PANEL: CPT

## 2017-12-15 RX ADMIN — METOPROLOL TARTRATE 12.5 MG: 25 TABLET ORAL at 08:00

## 2017-12-15 RX ADMIN — FAMOTIDINE 20 MG: 20 TABLET, FILM COATED ORAL at 08:00

## 2017-12-15 RX ADMIN — VITAMIN D, TAB 1000IU (100/BT) 5000 UNITS: 25 TAB at 08:00

## 2017-12-15 RX ADMIN — RIVAROXABAN 15 MG: 15 TABLET, FILM COATED ORAL at 08:00

## 2017-12-15 RX ADMIN — BACITRACIN 1 G: 500 OINTMENT TOPICAL at 08:01

## 2017-12-15 ASSESSMENT — PAIN SCALES - GENERAL
PAINLEVEL_OUTOF10: 0
PAINLEVEL_OUTOF10: 0

## 2017-12-15 NOTE — PROGRESS NOTES
simple/routine tasks  Memory: 5 - Patient requires prompting with stress/unfamiliar situations    Review of Systems:  CONSTITUTIONAL:  positive for fatigue, weight loss and hot flashes  EYES:  negative  HEENT:  negative  RESPIRATORY:  negative  CARDIOVASCULAR:  negative  GASTROINTESTINAL:  negative  GENITOURINARY:  negative  SKIN:  negative  HEMATOLOGIC/LYMPHATIC:  negative  MUSCULOSKELETAL:  negative  NEUROLOGICAL:  positive for memory problems  BEHAVIOR/PSYCH:  positive for confusion  10 point system review otherwise negative    Objective:  BP (!) 120/58   Pulse 90   Temp 98.2 °F (36.8 °C)   Resp 18   Ht 6' 3\" (1.905 m)   Wt 263 lb (119.3 kg)   SpO2 96%   BMI 32.87 kg/m²     awake  Orientation:   person, place, time, situation  Mood: within normal limits  Affect: jovial  General appearance:  in no acute distress, up in chair     Memory:  grossly normal  Attention/Concentration: normal  Language:  normal     Motor Exam:  Motor exam is symmetrical 5 out of 5 all extremities bilaterally     Coordination:   normal     Skin: warm and dry, no rash or erythema, laceration at lateral RIGHT eyebrow and ear  Peripheral vascular: Pulses: Normal upper and lower extremity pulses; Edema: 1+    Diagnostics:   Recent Results (from the past 24 hour(s))   Hepatic Function Panel    Collection Time: 12/14/17  6:06 AM   Result Value Ref Range    Alb 3.3 (L) 3.5 - 5.1 g/dL    Total Bilirubin 0.5 0.3 - 1.2 mg/dL    Bilirubin, Direct <0.2 0.0 - 0.3 mg/dL    Alkaline Phosphatase 82 38 - 126 U/L    AST 25 5 - 40 U/L    ALT 76 (H) 11 - 66 U/L    Total Protein 6.5 6.1 - 8.0 g/dL   Vitamin D 25 Hydroxy    Collection Time: 12/14/17  6:06 AM   Result Value Ref Range    Vit D, 25-Hydroxy 21 (L) 30 - 100 ng/ml   T4, Free    Collection Time: 12/14/17  6:06 AM   Result Value Ref Range    T4 Free 2.56 (H) 0.93 - 1.76 ng/dL     Impression:  1.  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 2. Mild cognitive impairments SCCAN completed. Patient scored 79/94. Normal is greater than or equal to 87/94.  3.    Gait instability. 4. Acute respiratory failure requiring intubation. 5. Tachycardia. 6. Graves Disease. 7. Pulmonary embolism in medial LEFT lower lobe basilar segmental pulmonary emboli. 8. Incidental 7 x 7 mm RIGHT upper lobe pulmonary nodule. 9. RIGHT ear laceration. 10. HTN. 11. Transaminitis. 12. Nicotine abuse. 13. Hypovitaminosis D, Vitamin 25OH level of 18 on 2017.     Plan:      Medical management: No provider consulted at this time. .     Consultants: Otolaryngology, Trauma Surgery, Neurosurgery, Critical Care, Plastic Surgery, Internal Medicine, Endocrinology, Gastroenterology, Physical Medicine     Acute/Rehabilitation Problems:  1. TBI with diffuse axonal injury. 1. Presents as a Rancho level 6 currently. 2. TBI protocol initiated. 3. Bright light therapy ordered. 4. Family requesting to act as his sitter. 5. Allowed 1 hour of TV starting  after therapies. Relaxed to unlimited after therapy along with cellphone usage after therapies on . 6. Allowed off floor . 2. Mild cognitive impairments. 1. SLP. 2. Cog Lo/30 on .  3. Will require transition period where he works a couple of days (part time) prior to resuming full time employment when cognitive skills have significantly improved to manage daily scheduling, inventory and overall organizational routines. 3. Gait instability. 1. PT/OT. 1. Ambulated 350' , 180't and several small distance <50' on level tile with no Device at independent level with good foot clearance and posture, good safety awreness, no LOB  12 6\" steps in the parallel bars. 4. Tachycardia. 1. Metoprolol. Decreased to 12.5mg on . Will discontinue starting . 2. Likely caused by thyroid abnormality. 3. 24 hour range . orders for BMP before this imaging. 7. Incidental 7 x 7 mm RIGHT upper lobe pulmonary nodule. 1. Will require follow up imaging. Ordered as above with chest CTA w/wo. 6 month follow up recommended. 8. RIGHT ear laceration. 1. No intervention per ENT and Plastic Surgery. 9. HTN. 1. Likely caused by thyroid abnormality. Stable pressures. 10. Transaminitis. 1. ALT/AST elevated on 12/14 76/25, 12/13 86/23, 12/12 130/37, 12/9 at 121/50. On 12/5 was 79/34. On 12/3 63/41. 2. Hepatitis panel ordered 12/9. Negative. 3. Liver U/S ordered 12/9. Hemangioma noted. 4. Discussed labs with patient. Discussed again on 12/11 with mom and patient. 5. Follow up imaging ordered at 6 months 6/11/2018 for repeat U/S  6. Gastroenterology consulted. Appreciate input. 1. Labs pending. 2. Recommendation for stopping Tapazole and PTU also not an option. 11. Nutrition:  Consultation to dietician for nutritional counseling and recommendations. TotP 6.5, alb 3.8, Vitamin 25OH level of 18 on 12/9/2017. Vitamin D25OH 21 on 12/14/2017. 12. Bladder: No isues  13. Bowel: Senna, colace, MOM Last BM 12/14 x 2.  14. Rehabilitation nursing will be involved for bowel, bladder, skin, and pain management. Nursing will also provide education and training to patient and family. 15. Prophylaxis:  DVT: Xarelto. GI: Pepcid. 16.  and case management consultations for coordination of care and discharge planning. 1. Letter provided to patient on 12/9 stating that he is in the hospital.  Estimated Length of Stay: 12/15/2017  Destination: outpatient therapies  Appropriate to trial functional independence apartment stay: No   Pass: No  Services at Discharge: Outpatient Physical Therapy, Occupational Therapy and Speech Therapy 3x week  Equipment at Discharge: None     Chronic Problems:  1. Nicotine abuse. 1. Nicoderm. 2. Requests to not continue this after discharge. Will stop smoking without patches.   3. Counseled that smoking is a risk factor for Graves Disease.     Labs:  1. Labs reviewed 12/11. 2. Reviewed 12/12. 1. Iron 49, TIBC 218. 3. Reviewed 12/13. 4. Reviewed 12/14.     Infectious Disease:  1. N/A    Missed Therapy Time:  None     DME:  None.     Andrea Ulloa MD
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
## 2017-12-15 NOTE — PROGRESS NOTES
[facility]  INPATIENT SPEECH THERAPY  [facility]    TIME   SLP Individual Minutes  Time In: 5495  Time Out: 1194  Minutes: 30  Timed Code Treatment Minutes: 27 Minutes       []Daily Note  []Progress Note  [x]Discharge Note    Date: 12/15/2017  Patient Name: Arturo Haq        MRN: [id number]    : 1985  (33 y.o.)  Gender: male   Primary Provider: Dora Burger MD  Admitting Diagnosis: Diffuse axonal brain injury with LOC of unspecified duration   Secondary Diagnosis: Cognitive deficits   Precautions: Fall risk, TBI precautions   Swallowing Status/Diet: Regular diet and thin liquids   Swallowing Strategies: Standard   DATE of last MBS:  N/a     Subjective: Pt awake and alert, no complaint of pain or discomfort. Highly excited to discharge to home setting today. Cooperative with POC with mother present at the completion of the session. Final education provided to pt and mother re: generation of medication/prescription list for placement in [facility], environmental modifications to implement upon return to vocational employment, utilizing smart phone and subsequent applications (i.e. Alarm, calendar, emergency contact) to improve functioning in home setting, and being a self-advocate to family, friends, and co-workers re: cognitive changes s/p medical TBI event; verbal comprehension obtained for education provided. SHORT TERM GOAL #1:  Goal 1: Pt will complete functional carryover, delayed recall and working recall tasks with 80% accuracy, mod cues for improved retention of newly learned medical information. GOAL NOT CONSISTENTLY MET  INTERVENTIONS: Cog lo/30   *independent completion of immediate repetition, counting backwards 20-1, estimation of 30 second time lapse, repetition of sequential hand movements, and basic attention  *spontaneous delayed recall of address  *stable performance with manipulation of TRACE in reverse order (omission of May)  *decreased temporal

## 2017-12-15 NOTE — PROGRESS NOTES
Hospitalist Progress Note  12/14/2017 7:51 PM  Subjective:   Admit Date: 12/8/2017  PCP: Franca William MD  Interval History: 41-year-old male being seen in follow-up for hypothyroidism due to Graves' disease. Patient unable to tolerate Tapazole due to liver problems. He recently received iodine contrast in CTA. Labs reviewed. He denies chest pain and shortness of breath. Denies fevers and chills. He denies abdominal pain, nausea and vomiting. I had a lengthy discussion with the patient and his mother, both yesterday and today. Ros: As noted in HPI. The remainder of systems were reviewed and negative. PFSH: unchanged from admission. Diet: DIET GENERAL;  Dietary Nutrition Supplements: Standard High Calorie Oral Supplement  Medications:   Scheduled Meds:   metoprolol tartrate  12.5 mg Oral BID    vitamin D  5,000 Units Oral Daily    senna  2 tablet Oral Nightly    bacitracin  1 g Topical BID    famotidine  20 mg Oral BID    nicotine  1 patch Transdermal Daily    rivaroxaban  15 mg Oral BID WC    Followed by   Kalyn Drake ON 12/30/2017] rivaroxaban  20 mg Oral Daily     Continuous Infusions:   CBC: No results for input(s): WBC, HGB, PLT in the last 72 hours. BMP:  No results for input(s): NA, K, CL, CO2, BUN, CREATININE, GLUCOSE in the last 72 hours. Hepatic:   Recent Labs      12/13/17   0614  12/14/17   0606   AST  23  25   ALT  86*  76*   BILITOT  0.6  0.5   ALKPHOS  80  82     Troponin: No results for input(s): TROPONINI in the last 72 hours. BNP: No results for input(s): BNP in the last 72 hours. Lipids: No results for input(s): CHOL, HDL in the last 72 hours. Invalid input(s): LDLCALCU  INR: No results for input(s): INR in the last 72 hours.     Objective:   Vitals: /64   Pulse 84   Temp 97.6 °F (36.4 °C) (Oral)   Resp 14   Ht 6' 3\" (1.905 m)   Wt 263 lb (119.3 kg)   SpO2 97%   BMI 32.87 kg/m²   General appearance: alert and cooperative with exam  HEENT: Head: Normal,

## 2017-12-15 NOTE — PROGRESS NOTES
[facility]  Recreational Therapy  Discharge Note  Inpatient Rehabilitation Unit           Date:  12/15/2017       Patient Name: Lorenzo Noriega      MRN: [id number]       YOB: 1985 (33 y.o.)       Gender: male  Diagnosis: diffuse axonal brain injury with loss of consciousness   Referring Practitioner: Shadia Kohler MD    Patient discharged from Recreational Therapy at this time. See recreational therapy notes for details.     Electronically signed by: TYESHA Salazar  Date: 12/15/2017

## 2017-12-15 NOTE — PROGRESS NOTES
community mobility.--GOAL MET, see LTG  Short term goal 4: Pt to ascend/descend porch step with RW , CGA, for home entry. MET, see LTG   Short term goal 5: Pt to get in/out of car, CGA, for transportation needs. MET, see LTG     Long term goals  Time Frame for Long term goals : 3 wks. Long term goal 1: Pt to go supine <->sit, Mod I, to get in/out of bed.--GOAL MET  Long term goal 2: Pt to get up/down from various seated surfaces, Mod I, to get up to walk. MET   Long term goal 3: Pt to walk with RW >= 150 ft, Mod I for community mobility, >= 50 ft, no devices, Mod I for home mobility. MET   Long term goal 4: Pt to ascend/descend porch step Mod I, no devices,  for home entry.  MET   Long term goal 5: Pt to ascend/descend 4+ steps with donovan rails, Mod I, for community mobility-MET

## 2017-12-15 NOTE — PLAN OF CARE
Problem: Falls - Risk of  Goal: Absence of falls  Outcome: Met This Shift  MOD I, no falls    Problem: Health Behavior:  Goal: Demonstration of safe behaviors will increase  Demonstration of safe behaviors will increase   Outcome: Met This Shift  WNL    Problem: Safety:  Goal: Ability to remain free from injury will improve  Ability to remain free from injury will improve   Outcome: Met This Shift  Free from injury    Problem: Bleeding:  Goal: Will show no signs and symptoms of excessive bleeding  Will show no signs and symptoms of excessive bleeding   Outcome: Met This Shift  No s/s of bleeding    Problem: Nutritional:  Goal: Nutritional status will improve  Nutritional status will improve   Outcome: Met This Shift  Optimal nutrition    Problem: Physical Regulation:  Goal: Diagnostic test results will improve  Diagnostic test results will improve   Outcome: Met This Shift  See labs  Goal: Will remain free from infection  Will remain free from infection   Outcome: Met This Shift  No s/s of infection  Goal: Ability to maintain vital signs within normal range will improve  Ability to maintain vital signs within normal range will improve   Outcome: Met This Shift  See vitals    Problem: Skin Integrity:  Goal: Complications related to intravenous access or infusion will be avoided or minimized  Complications related to intravenous access or infusion will be avoided or minimized   Outcome: Met This Shift  No INT    Problem: DISCHARGE BARRIERS  Goal: Patient's continuum of care needs are met  Outcome: Met This Shift  Care needs met    Problem: Pain:  Goal: Pain level will decrease  Pain level will decrease   Outcome: Met This Shift  Denies pain  Goal: Control of acute pain  Control of acute pain   Outcome: Met This Shift  Denies pain  Goal: Control of chronic pain  Control of chronic pain   Outcome: Met This Shift  Denies pain    Comments: Care plan reviewed with patient.   Patient verbalize understanding of the plan of care and contribute to goal setting.

## 2017-12-22 ENCOUNTER — TELEPHONE (OUTPATIENT)
Dept: ENDOCRINOLOGY | Age: 32
End: 2017-12-22

## 2017-12-22 NOTE — TELEPHONE ENCOUNTER
JOSE SCHRADER FROM [facility] CALLED AND REQUESTED PROGRESS REPORT FROM DR SUMMERS. PT IS GOING TO THEIR OFFICE TODAY. I FAXED REPORT  [phone].
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
